# Patient Record
Sex: FEMALE | Race: WHITE | NOT HISPANIC OR LATINO | Employment: OTHER | URBAN - METROPOLITAN AREA
[De-identification: names, ages, dates, MRNs, and addresses within clinical notes are randomized per-mention and may not be internally consistent; named-entity substitution may affect disease eponyms.]

---

## 2018-06-28 ENCOUNTER — TRANSCRIBE ORDERS (OUTPATIENT)
Dept: ADMINISTRATIVE | Facility: HOSPITAL | Age: 63
End: 2018-06-28

## 2018-06-28 DIAGNOSIS — M81.0 OSTEOPOROSIS, UNSPECIFIED OSTEOPOROSIS TYPE, UNSPECIFIED PATHOLOGICAL FRACTURE PRESENCE: Primary | ICD-10-CM

## 2018-07-02 ENCOUNTER — HOSPITAL ENCOUNTER (OUTPATIENT)
Dept: BONE DENSITY | Facility: HOSPITAL | Age: 63
Discharge: HOME | End: 2018-07-02
Payer: COMMERCIAL

## 2018-07-02 DIAGNOSIS — M81.0 OSTEOPOROSIS, UNSPECIFIED OSTEOPOROSIS TYPE, UNSPECIFIED PATHOLOGICAL FRACTURE PRESENCE: ICD-10-CM

## 2018-07-02 PROCEDURE — 77080 DXA BONE DENSITY AXIAL: CPT

## 2019-07-01 ENCOUNTER — TRANSCRIBE ORDERS (OUTPATIENT)
Dept: ADMINISTRATIVE | Facility: HOSPITAL | Age: 64
End: 2019-07-01

## 2019-07-01 ENCOUNTER — HOSPITAL ENCOUNTER (OUTPATIENT)
Dept: RADIOLOGY | Facility: HOSPITAL | Age: 64
Discharge: HOME | End: 2019-07-01
Payer: MEDICARE

## 2019-07-01 DIAGNOSIS — R06.02 SHORTNESS OF BREATH: ICD-10-CM

## 2019-07-01 DIAGNOSIS — R06.02 SHORTNESS OF BREATH: Primary | ICD-10-CM

## 2019-07-01 PROCEDURE — 71046 X-RAY EXAM CHEST 2 VIEWS: CPT

## 2019-07-29 ENCOUNTER — TRANSCRIBE ORDERS (OUTPATIENT)
Dept: ADMINISTRATIVE | Facility: HOSPITAL | Age: 64
End: 2019-07-29

## 2019-07-29 DIAGNOSIS — R06.02 SOB (SHORTNESS OF BREATH): Primary | ICD-10-CM

## 2019-07-30 ENCOUNTER — TRANSCRIBE ORDERS (OUTPATIENT)
Dept: NON INVASIVE DIAGNOSTICS | Facility: CLINIC | Age: 64
End: 2019-07-30

## 2019-07-30 DIAGNOSIS — R06.02 SHORTNESS OF BREATH: Primary | ICD-10-CM

## 2019-07-30 DIAGNOSIS — R51.9 NONINTRACTABLE HEADACHE, UNSPECIFIED CHRONICITY PATTERN, UNSPECIFIED HEADACHE TYPE: ICD-10-CM

## 2019-07-30 DIAGNOSIS — L94.0 SCLERODERMA, LOCALIZED: ICD-10-CM

## 2019-07-30 DIAGNOSIS — M54.5 LOW BACK PAIN, UNSPECIFIED BACK PAIN LATERALITY, UNSPECIFIED CHRONICITY, WITH SCIATICA PRESENCE UNSPECIFIED: ICD-10-CM

## 2019-08-06 ENCOUNTER — HOSPITAL ENCOUNTER (OUTPATIENT)
Dept: PULMONOLOGY | Facility: HOSPITAL | Age: 64
Discharge: HOME | End: 2019-08-06
Payer: MEDICARE

## 2019-08-06 DIAGNOSIS — R06.02 SOB (SHORTNESS OF BREATH): ICD-10-CM

## 2019-08-06 PROCEDURE — 94729 DIFFUSING CAPACITY: CPT

## 2019-08-06 PROCEDURE — 94760 N-INVAS EAR/PLS OXIMETRY 1: CPT

## 2019-08-06 PROCEDURE — 94727 GAS DIL/WSHOT DETER LNG VOL: CPT

## 2019-08-06 PROCEDURE — 94726 PLETHYSMOGRAPHY LUNG VOLUMES: CPT | Performed by: INTERNAL MEDICINE

## 2019-08-06 PROCEDURE — 94060 EVALUATION OF WHEEZING: CPT

## 2019-08-06 PROCEDURE — 94060 EVALUATION OF WHEEZING: CPT | Performed by: INTERNAL MEDICINE

## 2019-08-06 PROCEDURE — 94729 DIFFUSING CAPACITY: CPT | Performed by: INTERNAL MEDICINE

## 2019-08-06 RX ORDER — ALBUTEROL SULFATE 2.5 MG/3ML
2.5 SOLUTION RESPIRATORY (INHALATION) ONCE AS NEEDED
Status: COMPLETED | OUTPATIENT
Start: 2019-08-06 | End: 2019-08-06

## 2019-08-06 RX ADMIN — ALBUTEROL SULFATE 2.5 MG: 2.5 SOLUTION RESPIRATORY (INHALATION) at 09:04

## 2019-09-09 ENCOUNTER — HOSPITAL ENCOUNTER (OUTPATIENT)
Dept: NON INVASIVE DIAGNOSTICS | Facility: CLINIC | Age: 64
Discharge: HOME | End: 2019-09-09
Payer: MEDICARE

## 2019-09-09 DIAGNOSIS — L94.0 SCLERODERMA, LOCALIZED: ICD-10-CM

## 2019-09-09 DIAGNOSIS — R06.02 SHORTNESS OF BREATH: ICD-10-CM

## 2019-09-09 DIAGNOSIS — R51.9 NONINTRACTABLE HEADACHE, UNSPECIFIED CHRONICITY PATTERN, UNSPECIFIED HEADACHE TYPE: ICD-10-CM

## 2019-09-09 DIAGNOSIS — M54.5 LOW BACK PAIN, UNSPECIFIED BACK PAIN LATERALITY, UNSPECIFIED CHRONICITY, WITH SCIATICA PRESENCE UNSPECIFIED: ICD-10-CM

## 2019-09-09 PROCEDURE — 93306 TTE W/DOPPLER COMPLETE: CPT | Performed by: INTERNAL MEDICINE

## 2019-09-09 PROCEDURE — 93306 TTE W/DOPPLER COMPLETE: CPT

## 2019-11-18 ENCOUNTER — HOSPITAL ENCOUNTER (OUTPATIENT)
Dept: RADIOLOGY | Facility: HOSPITAL | Age: 64
Discharge: HOME | End: 2019-11-18
Payer: MEDICARE

## 2019-11-18 ENCOUNTER — TRANSCRIBE ORDERS (OUTPATIENT)
Dept: ADMINISTRATIVE | Facility: HOSPITAL | Age: 64
End: 2019-11-18

## 2019-11-18 DIAGNOSIS — M54.2 CERVICALGIA: ICD-10-CM

## 2019-11-18 DIAGNOSIS — M54.2 CERVICALGIA: Primary | ICD-10-CM

## 2019-11-18 PROCEDURE — 72050 X-RAY EXAM NECK SPINE 4/5VWS: CPT

## 2020-02-14 ENCOUNTER — HOSPITAL ENCOUNTER (OUTPATIENT)
Dept: RADIOLOGY | Facility: HOSPITAL | Age: 65
Discharge: HOME | End: 2020-02-14
Payer: MEDICARE

## 2020-02-14 ENCOUNTER — TRANSCRIBE ORDERS (OUTPATIENT)
Dept: ADMINISTRATIVE | Facility: HOSPITAL | Age: 65
End: 2020-02-14

## 2020-02-14 DIAGNOSIS — M25.511 RIGHT SHOULDER PAIN, UNSPECIFIED CHRONICITY: ICD-10-CM

## 2020-02-14 DIAGNOSIS — M25.511 RIGHT SHOULDER PAIN, UNSPECIFIED CHRONICITY: Primary | ICD-10-CM

## 2020-02-14 PROCEDURE — 73030 X-RAY EXAM OF SHOULDER: CPT

## 2020-03-10 ENCOUNTER — TRANSCRIBE ORDERS (OUTPATIENT)
Dept: NON INVASIVE DIAGNOSTICS | Facility: CLINIC | Age: 65
End: 2020-03-10

## 2020-03-10 DIAGNOSIS — I77.819 DILATATION OF AORTA (HCC): Primary | ICD-10-CM

## 2020-04-10 ENCOUNTER — HOSPITAL ENCOUNTER (OUTPATIENT)
Dept: NON INVASIVE DIAGNOSTICS | Facility: CLINIC | Age: 65
Discharge: HOME | End: 2020-04-10
Payer: MEDICARE

## 2020-04-10 DIAGNOSIS — I77.819 DILATATION OF AORTA (HCC): ICD-10-CM

## 2020-04-10 PROCEDURE — 93306 TTE W/DOPPLER COMPLETE: CPT

## 2020-04-10 PROCEDURE — 93306 TTE W/DOPPLER COMPLETE: CPT | Performed by: INTERNAL MEDICINE

## 2020-05-26 ENCOUNTER — TRANSCRIBE ORDERS (OUTPATIENT)
Dept: CARDIOLOGY CLINIC | Facility: CLINIC | Age: 65
End: 2020-05-26

## 2020-05-26 DIAGNOSIS — Z01.818 PRE-OP EVALUATION: Primary | ICD-10-CM

## 2020-05-27 ENCOUNTER — CONSULT (OUTPATIENT)
Dept: CARDIOLOGY CLINIC | Facility: CLINIC | Age: 65
End: 2020-05-27
Payer: MEDICARE

## 2020-05-27 VITALS
HEIGHT: 64 IN | HEART RATE: 70 BPM | WEIGHT: 126 LBS | DIASTOLIC BLOOD PRESSURE: 86 MMHG | BODY MASS INDEX: 21.51 KG/M2 | SYSTOLIC BLOOD PRESSURE: 120 MMHG

## 2020-05-27 DIAGNOSIS — Z01.818 PRE-OP EVALUATION: ICD-10-CM

## 2020-05-27 DIAGNOSIS — I71.2 THORACIC AORTIC ANEURYSM WITHOUT RUPTURE (HCC): ICD-10-CM

## 2020-05-27 DIAGNOSIS — R06.00 DOE (DYSPNEA ON EXERTION): Primary | ICD-10-CM

## 2020-05-27 DIAGNOSIS — Z82.49 FAMILY HISTORY OF EARLY CAD: ICD-10-CM

## 2020-05-27 PROBLEM — I71.20 THORACIC AORTIC ANEURYSM WITHOUT RUPTURE: Status: ACTIVE | Noted: 2020-05-27

## 2020-05-27 PROCEDURE — 99204 OFFICE O/P NEW MOD 45 MIN: CPT | Performed by: PHYSICIAN ASSISTANT

## 2020-05-27 PROCEDURE — 93000 ELECTROCARDIOGRAM COMPLETE: CPT | Performed by: PHYSICIAN ASSISTANT

## 2020-05-27 RX ORDER — ALPRAZOLAM 0.25 MG/1
0.25 TABLET ORAL DAILY PRN
COMMUNITY
Start: 2020-05-13

## 2020-05-27 RX ORDER — ESTRADIOL 0.1 MG/G
CREAM VAGINAL
COMMUNITY
Start: 2018-10-05

## 2020-05-27 RX ORDER — CLOBETASOL PROPIONATE 0.5 MG/G
1 CREAM TOPICAL AS NEEDED
COMMUNITY
Start: 2020-05-11

## 2020-06-02 DIAGNOSIS — R06.02 SOB (SHORTNESS OF BREATH): Primary | ICD-10-CM

## 2020-06-08 ENCOUNTER — HOSPITAL ENCOUNTER (OUTPATIENT)
Dept: CT IMAGING | Facility: HOSPITAL | Age: 65
Discharge: HOME | End: 2020-06-08
Payer: MEDICARE

## 2020-06-08 DIAGNOSIS — R06.00 DOE (DYSPNEA ON EXERTION): ICD-10-CM

## 2020-06-08 DIAGNOSIS — I71.2 THORACIC AORTIC ANEURYSM WITHOUT RUPTURE (HCC): ICD-10-CM

## 2020-06-08 PROCEDURE — 71260 CT THORAX DX C+: CPT

## 2020-06-08 RX ADMIN — IOHEXOL 85 ML: 350 INJECTION, SOLUTION INTRAVENOUS at 11:18

## 2020-06-10 ENCOUNTER — TELEPHONE (OUTPATIENT)
Dept: CARDIOLOGY CLINIC | Facility: CLINIC | Age: 65
End: 2020-06-10

## 2020-06-11 ENCOUNTER — TELEPHONE (OUTPATIENT)
Dept: CARDIOLOGY CLINIC | Facility: CLINIC | Age: 65
End: 2020-06-11

## 2020-06-29 ENCOUNTER — HOSPITAL ENCOUNTER (OUTPATIENT)
Dept: NON INVASIVE DIAGNOSTICS | Facility: CLINIC | Age: 65
Discharge: HOME | End: 2020-06-29
Payer: MEDICARE

## 2020-06-29 DIAGNOSIS — I71.2 THORACIC AORTIC ANEURYSM WITHOUT RUPTURE (HCC): ICD-10-CM

## 2020-06-29 DIAGNOSIS — R06.00 DOE (DYSPNEA ON EXERTION): ICD-10-CM

## 2020-06-29 PROCEDURE — 93351 STRESS TTE COMPLETE: CPT | Performed by: INTERNAL MEDICINE

## 2020-06-29 PROCEDURE — 93350 STRESS TTE ONLY: CPT

## 2020-07-08 ENCOUNTER — OFFICE VISIT (OUTPATIENT)
Dept: CARDIOLOGY CLINIC | Facility: CLINIC | Age: 65
End: 2020-07-08
Payer: MEDICARE

## 2020-07-08 VITALS
DIASTOLIC BLOOD PRESSURE: 80 MMHG | HEIGHT: 64 IN | HEART RATE: 80 BPM | WEIGHT: 122 LBS | BODY MASS INDEX: 20.83 KG/M2 | SYSTOLIC BLOOD PRESSURE: 110 MMHG

## 2020-07-08 DIAGNOSIS — Z82.49 FAMILY HISTORY OF EARLY CAD: ICD-10-CM

## 2020-07-08 DIAGNOSIS — R06.02 SOB (SHORTNESS OF BREATH): ICD-10-CM

## 2020-07-08 DIAGNOSIS — R91.8 PULMONARY NODULES: Primary | ICD-10-CM

## 2020-07-08 DIAGNOSIS — I71.2 THORACIC AORTIC ANEURYSM WITHOUT RUPTURE (HCC): ICD-10-CM

## 2020-07-08 PROCEDURE — 99214 OFFICE O/P EST MOD 30 MIN: CPT | Performed by: PHYSICIAN ASSISTANT

## 2020-07-08 RX ORDER — PRAVASTATIN SODIUM 10 MG
5 TABLET ORAL DAILY
COMMUNITY
Start: 2020-06-08

## 2020-07-08 NOTE — ASSESSMENT & PLAN NOTE
BREWER with climbing stairs or walking up an incline  ALISON is non-ischemic  CT showed lung nodules   Patient will discuss with PCP

## 2020-07-08 NOTE — ASSESSMENT & PLAN NOTE
Aortic arch was found to be 3 4 cm and the ascending Aorta was 4 4 cm on echo  CT shows Aorta to be 4 cm at the right pulmonary artery      With Symptoms of BREWER   ALISON was non-ischemic

## 2020-07-08 NOTE — PROGRESS NOTES
[facility]   Outpatient Note  Tari Villarreal  1955  [id number]  [facility] [address] [facility] [address]  [facility] [id number] [phone]    Tari Villarreal is a 76 y o  female    Assessment and Plan:   Thoracic aortic aneurysm without rupture (HCC)  Aortic arch was found to be 3 4 cm and the ascending Aorta was 4 4 cm on echo  CT shows Aorta to be 4 cm at the right pulmonary artery      With Symptoms of BREWER   ALISON was non-ischemic       SOB (shortness of breath)  BREWER with climbing stairs or walking up an incline  ALISON is non-ischemic  CT showed lung nodules   Patient will discuss with PCP    Family history of early CAD  Brother had MI at 77        Additional Plan:   No Medication changes made or testing ordered today  Return visit will be in one year months or earlier if there are problems  The patient is encouraged to call in the meantime if there are questions or concerns  Subjective: The patient is here for a follow up regarding a finding of an aneurysmal Thoracic Aorta found on echo that was obtained because she was complaining of SOB and chest pain  The Aortic arch was found to be 3 4 cm and the ascending Aorta was 4 4 cm   She has been complaining BREWER and chest tightness that she has been attributing to her "body structure"   She tells me however that she is SOB when she climbs the stairs or walks up an incline  She states that her chest pain was alleviated with massage therapy techniques, but returned after a few weeks  From a cardiac perspective, she has no palpitations syncope or near syncope, and denies edema orthopnea or PND  She does not complain of TIA or CVA symptoms           Social History  Social History     Tobacco Use   Smoking Status Never Smoker   Smokeless Tobacco Never Used   ,   Social History     Substance and Sexual Activity   Alcohol Use Yes    Comment: social   ,   Social History     Substance and Sexual Activity   Drug Use Never     Family History   Problem Relation Age of Onset    Pancreatitis Mother 59        nondrinker    Kidney cancer Father 58    Coronary artery disease Brother 72        left main    Heart attack Brother 72    Lymphoma Brother         nonhodgkin's       Medical and Surgical History  Past Medical History:   Diagnosis Date    Ankylosis     Anxiety     Frozen shoulder     Osteoporosis     Spinal stenosis     Thoracic aortic aneurysm without rupture      No past surgical history on file  Current Outpatient Medications:     ALPRAZolam (XANAX) 0 25 mg tablet, Take 0 25 mg by mouth daily as needed, Disp: , Rfl:     clobetasol (TEMOVATE) 0 05 % cream, Apply to affected areas 2-3x/week for maintenance, Disp: , Rfl:     estradiol (ESTRACE VAGINAL) 0 1 mg/g vaginal cream, INSERT 1 GRAM VAGINALLY ONCE A WEEK, Disp: , Rfl:     pravastatin (PRAVACHOL) 10 mg tablet, Take 5 mg by mouth daily, Disp: , Rfl:   Allergies   Allergen Reactions    Amoxicillin-Pot Clavulanate Rash    Sulfa Antibiotics Rash       Review of Systems   Constitution: Negative  HENT: Negative  Eyes: Negative  Cardiovascular: Positive for dyspnea on exertion  Negative for chest pain, claudication, cyanosis, irregular heartbeat, leg swelling, near-syncope, orthopnea, palpitations, paroxysmal nocturnal dyspnea and syncope  Respiratory: Negative  Negative for cough, hemoptysis, shortness of breath, sleep disturbances due to breathing, snoring, sputum production and wheezing  Endocrine: Negative  Hematologic/Lymphatic: Negative  Skin: Negative  Musculoskeletal: Negative  Gastrointestinal: Negative  Genitourinary: Negative  Neurological: Negative  Psychiatric/Behavioral: Negative  Allergic/Immunologic: Negative          Objective:   /80   Pulse 80   Ht 5' 3 5" (1 613 m)   Wt 55 3 kg (122 lb)   BMI 21 27 kg/m²   Physical Exam   Constitutional: She is oriented to person, place, and time  She appears well-developed and well-nourished  HENT:   Head: Normocephalic and atraumatic  Mouth/Throat: Oropharynx is clear and moist    Eyes: Conjunctivae and EOM are normal  No scleral icterus  Neck: Normal range of motion  Neck supple  No JVD present  No tracheal deviation present  Cardiovascular: Normal rate, regular rhythm, normal heart sounds and intact distal pulses  Exam reveals no gallop and no friction rub  No murmur heard  Pulmonary/Chest: Effort normal and breath sounds normal  No respiratory distress  She has no wheezes  She has no rales  She exhibits no tenderness  Abdominal: Soft  Bowel sounds are normal  She exhibits no distension  There is no tenderness  Aorta not palpable    Musculoskeletal: Normal range of motion  She exhibits no edema or tenderness  Neurological: She is alert and oriented to person, place, and time  Skin: Skin is warm and dry  No rash noted  No erythema  No pallor  Psychiatric: She has a normal mood and affect  Her behavior is normal    Nursing note and vitals reviewed  Lab Review:   No results found for: CHOL  No results found for: HDL  No results found for: LDLCALC  No results found for: TRIG  Results Reviewed     None        Results Reviewed     None        Results Reviewed     None          Recent Cardiovascular Testing:   CT 6-8-2020: Enlargement of the ascending aorta measuring 4 cm at the level the right pulmonary artery  2  Scattered pulmonary nodules measuring up to 5 mm  ALISON 6-: Normal symptom, BP, and ECG response  I couldn't absolutely exclude small prior inferobasal MI  No ischemia by echo criteria  ECHO 4-: LEFT VENTRICLE:  Systolic function was normal  Ejection fraction was estimated to be 65 %  There were no regional wall motion abnormalities    RIGHT VENTRICLE:Systolic function was normal    AORTIC VALVE:The valve was trileaflet   Leaflets exhibited normal thickness and normal cuspal separation  There was mild regurgitation    TRICUSPID VALVE:There was mild regurgitation    AORTA:The root exhibited mild dilatation  There was moderate dilatation of the ascending aorta  4 4 cm  There was mild dilatation of the aortic arch  3 8 cm      ECG Review:   NSR

## 2020-12-02 ENCOUNTER — TRANSCRIBE ORDERS (OUTPATIENT)
Dept: ADMINISTRATIVE | Facility: HOSPITAL | Age: 65
End: 2020-12-02

## 2020-12-02 DIAGNOSIS — R30.0 DYSURIA: Primary | ICD-10-CM

## 2020-12-07 ENCOUNTER — HOSPITAL ENCOUNTER (OUTPATIENT)
Dept: ULTRASOUND IMAGING | Facility: HOSPITAL | Age: 65
Discharge: HOME | End: 2020-12-07
Payer: MEDICARE

## 2020-12-07 DIAGNOSIS — R30.0 DYSURIA: ICD-10-CM

## 2020-12-07 DIAGNOSIS — R10.2 PELVIC PAIN IN FEMALE: ICD-10-CM

## 2020-12-07 DIAGNOSIS — N30.10 INTERSTITIAL CYSTITIS: ICD-10-CM

## 2020-12-07 PROCEDURE — 76856 US EXAM PELVIC COMPLETE: CPT

## 2020-12-07 PROCEDURE — 76830 TRANSVAGINAL US NON-OB: CPT

## 2021-03-16 ENCOUNTER — TELEPHONE (OUTPATIENT)
Dept: NEUROLOGY | Facility: CLINIC | Age: 66
End: 2021-03-16

## 2021-03-16 NOTE — TELEPHONE ENCOUNTER
Best contact number for FZWWLNM:[phone]    Emergency Contact name and number:None    Referring provider and telephone number: Abdileonie Nguyen    Primary Care Provider Name and if affiliated with [facility]     Reason for Appointment/Dx:Headache     Have you seen and followed up with a pediatric Neurologist for this disease in the past?  None    Neurology Location patient would like to be seen:[facility] received? Yes                                                 Records Received? No    Have you ever seen another Neurologist?       No     [facility]     ID/Policy #:    Secondary Insurance:    ID/Policy#: Workman's Comp/ Accident/ School  Information      Workman's Comp/Accident/School related?        None    If yes name of Insurance company:    Date of Injury:    Type of Injury:    [address] Name and Telephone Number:    Notes:Appointment schedule with patient new patient pack sent                    Appointment date: 10- 12:30pm with Dr Azucena Heimlich

## 2021-08-17 ENCOUNTER — HOSPITAL ENCOUNTER (OUTPATIENT)
Dept: BONE DENSITY | Facility: HOSPITAL | Age: 66
Discharge: HOME | End: 2021-08-17
Payer: MEDICARE

## 2021-08-17 DIAGNOSIS — M81.0 AGE-RELATED OSTEOPOROSIS WITHOUT CURRENT PATHOLOGICAL FRACTURE: ICD-10-CM

## 2021-08-17 PROCEDURE — 77080 DXA BONE DENSITY AXIAL: CPT

## 2021-09-10 ENCOUNTER — HOSPITAL ENCOUNTER (OUTPATIENT)
Dept: RADIOLOGY | Facility: HOSPITAL | Age: 66
Discharge: HOME | End: 2021-09-10
Payer: MEDICARE

## 2021-09-10 DIAGNOSIS — R06.02 SHORTNESS OF BREATH: ICD-10-CM

## 2021-09-10 PROCEDURE — 71046 X-RAY EXAM CHEST 2 VIEWS: CPT

## 2021-09-15 PROCEDURE — U0003 INFECTIOUS AGENT DETECTION BY NUCLEIC ACID (DNA OR RNA); SEVERE ACUTE RESPIRATORY SYNDROME CORONAVIRUS 2 (SARS-COV-2) (CORONAVIRUS DISEASE [COVID-19]), AMPLIFIED PROBE TECHNIQUE, MAKING USE OF HIGH THROUGHPUT TECHNOLOGIES AS DESCRIBED BY CMS-2020-01-R: HCPCS | Performed by: NURSE PRACTITIONER

## 2021-09-15 PROCEDURE — U0005 INFEC AGEN DETEC AMPLI PROBE: HCPCS | Performed by: NURSE PRACTITIONER

## 2021-09-22 ENCOUNTER — OFFICE VISIT (OUTPATIENT)
Dept: CARDIOLOGY CLINIC | Facility: CLINIC | Age: 66
End: 2021-09-22
Payer: MEDICARE

## 2021-09-22 VITALS
WEIGHT: 125 LBS | HEART RATE: 87 BPM | HEIGHT: 64 IN | DIASTOLIC BLOOD PRESSURE: 86 MMHG | SYSTOLIC BLOOD PRESSURE: 120 MMHG | BODY MASS INDEX: 21.34 KG/M2

## 2021-09-22 DIAGNOSIS — I71.2 THORACIC AORTIC ANEURYSM WITHOUT RUPTURE (HCC): Primary | ICD-10-CM

## 2021-09-22 DIAGNOSIS — R06.02 SOB (SHORTNESS OF BREATH): ICD-10-CM

## 2021-09-22 PROCEDURE — 93000 ELECTROCARDIOGRAM COMPLETE: CPT | Performed by: PHYSICIAN ASSISTANT

## 2021-09-22 PROCEDURE — 99214 OFFICE O/P EST MOD 30 MIN: CPT | Performed by: PHYSICIAN ASSISTANT

## 2021-09-22 RX ORDER — ALBUTEROL SULFATE 90 UG/1
AEROSOL, METERED RESPIRATORY (INHALATION)
COMMUNITY
Start: 2021-09-15

## 2021-09-22 NOTE — ASSESSMENT & PLAN NOTE
Aortic arch was found to be 3 4 cm and the ascending Aorta was 4 4 cm on echo 4/10/2020  CT shows Aorta to be 4 cm at the right pulmonary artery 6-8-2020      With Symptoms of BREWER   ALISON was non-ischemic 6-    Repeat Echo will be ordered to assess this year

## 2021-09-22 NOTE — PROGRESS NOTES
[facility]   Outpatient Note  Louann De  1955  [id number]  [facility] [address]  [facility] [id number] [phone]    Louann De is a 78 y o  female    Assessment and Plan:   SOB (shortness of breath)  Worsening for the last few months  Even at rest  Now on an inhaler and has improved some what but not yet at normal   awaiting PFT  Will check echo and BNP       Thoracic aortic aneurysm without rupture (HCC)  Aortic arch was found to be 3 4 cm and the ascending Aorta was 4 4 cm on echo 4/10/2020  CT shows Aorta to be 4 cm at the right pulmonary artery 6-8-2020      With Symptoms of BREWER   ALISON was non-ischemic 6-    Repeat Echo will be ordered to assess this year       Family history of early CAD  Brother had MI at 77        Additional Plan:   No Medication changes made or testing ordered today  Return visit will be in one year or earlier if there are problems  The patient is encouraged to call in the meantime if there are questions or concerns  Subjective: The patient is here for a follow up regarding a finding of an aneurysmal Thoracic Aorta found on echo that was obtained because she was complaining of SOB and chest pain  The Aortic arch was found to be 3 4 cm and the ascending Aorta was 4 4 cm  She is complaining of SOB and is being worked up for asthma  She has been prescribed an inhaler and is feeling much better  She is awaiting PFTs  She continues to run and is not hindered, but is not back to her baseline yet  She has no chest pain  From a cardiac perspective, she has no palpitations syncope or near syncope, and denies edema orthopnea or PND  She does not complain of TIA or CVA symptoms           Social History  Social History     Tobacco Use   Smoking Status Never Smoker   Smokeless Tobacco Never Used   ,   Social History     Substance and Sexual Activity Alcohol Use Yes    Comment: social   ,   Social History     Substance and Sexual Activity   Drug Use Never     Family History   Problem Relation Age of Onset    Pancreatitis Mother 59        nondrinker    Kidney cancer Father 58    Coronary artery disease Brother 72        left main    Heart attack Brother 72    Lymphoma Brother         nonhodgkin's       Medical and Surgical History  Past Medical History:   Diagnosis Date    Ankylosis     Anxiety     Ear problems     Eustachian tube dysfunction     Frozen shoulder     Osteoporosis     Spinal stenosis     Thoracic aortic aneurysm without rupture      Past Surgical History:   Procedure Laterality Date    CATARACT EXTRACTION Left     TONSILLECTOMY      TUBAL LIGATION      TYMPANOSTOMY TUBE PLACEMENT Bilateral          Current Outpatient Medications:     albuterol (Ventolin HFA) 90 mcg/act inhaler, inhale 2 puff by inhalation route  every 4 - 6 hours as needed, Disp: , Rfl:     ALPRAZolam (XANAX) 0 25 mg tablet, Take 0 25 mg by mouth daily as needed, Disp: , Rfl:     clobetasol (TEMOVATE) 0 05 % cream, Apply to affected areas 2-3x/week for maintenance, Disp: , Rfl:     estradiol (ESTRACE VAGINAL) 0 1 mg/g vaginal cream, INSERT 1 GRAM VAGINALLY ONCE A WEEK, Disp: , Rfl:     pravastatin (PRAVACHOL) 10 mg tablet, Take 5 mg by mouth daily (Patient not taking: Reported on 9/22/2021), Disp: , Rfl:   Allergies   Allergen Reactions    Amoxicillin-Pot Clavulanate Rash    Sulfa Antibiotics Rash       Review of Systems   Constitutional: Negative  HENT: Negative  Eyes: Negative  Cardiovascular: Negative for chest pain, claudication, cyanosis, dyspnea on exertion, irregular heartbeat, leg swelling, near-syncope, orthopnea, palpitations, paroxysmal nocturnal dyspnea and syncope  Respiratory: Positive for shortness of breath  Negative for cough, hemoptysis, sleep disturbances due to breathing, snoring, sputum production and wheezing      Endocrine: Negative  Hematologic/Lymphatic: Negative  Skin: Negative  Musculoskeletal: Negative  Gastrointestinal: Negative  Genitourinary: Negative  Neurological: Negative  Psychiatric/Behavioral: Negative  Allergic/Immunologic: Negative  Objective:   /86   Pulse 87   Ht 5' 3 5" (1 613 m)   Wt 56 7 kg (125 lb)   BMI 21 80 kg/m²   Physical Exam  Vitals and nursing note reviewed  Constitutional:       Appearance: She is well-developed  HENT:      Head: Normocephalic and atraumatic  Eyes:      General: No scleral icterus  Conjunctiva/sclera: Conjunctivae normal    Neck:      Vascular: No JVD  Trachea: No tracheal deviation  Cardiovascular:      Rate and Rhythm: Normal rate and regular rhythm  Pulses: Intact distal pulses  Heart sounds: Normal heart sounds  No murmur heard  No friction rub  No gallop  Pulmonary:      Effort: Pulmonary effort is normal  No respiratory distress  Breath sounds: Normal breath sounds  No wheezing or rales  Chest:      Chest wall: No tenderness  Abdominal:      General: Bowel sounds are normal  There is no distension  Palpations: Abdomen is soft  Tenderness: There is no abdominal tenderness  Comments: Aorta not palpable    Musculoskeletal:         General: No tenderness  Normal range of motion  Cervical back: Normal range of motion and neck supple  Skin:     General: Skin is warm and dry  Coloration: Skin is not pale  Findings: No erythema or rash  Neurological:      Mental Status: She is alert and oriented to person, place, and time     Psychiatric:         Behavior: Behavior normal          Lab Review:   No results found for: CHOL  No results found for: HDL  No results found for: LDLCALC  No results found for: TRIG  Results Reviewed     None        Results Reviewed     None        Results Reviewed     None          Recent Cardiovascular Testing:   CT 6-8-2020: Enlargement of the ascending aorta measuring 4 cm at the level the right pulmonary artery  2  Scattered pulmonary nodules measuring up to 5 mm  ALISON 6-: Normal symptom, BP, and ECG response  I couldn't absolutely exclude small prior inferobasal MI  No ischemia by echo criteria  ECHO 4-: LEFT VENTRICLE:  Systolic function was normal  Ejection fraction was estimated to be 65 %  There were no regional wall motion abnormalities    RIGHT VENTRICLE:Systolic function was normal    AORTIC VALVE:The valve was trileaflet  Leaflets exhibited normal thickness and normal cuspal separation  There was mild regurgitation    TRICUSPID VALVE:There was mild regurgitation    AORTA:The root exhibited mild dilatation  There was moderate dilatation of the ascending aorta  4 4 cm  There was mild dilatation of the aortic arch  3 8 cm      ECG Review:   9-22-21: Normal sinus rhythm   LAD  NS T wave changes     5/27/2020: NSR

## 2021-09-22 NOTE — ASSESSMENT & PLAN NOTE
Worsening for the last few months  Even at rest  Now on an inhaler and has improved some what but not yet at normal   awaiting PFT  Will check echo and BNP

## 2021-09-23 ENCOUNTER — HOSPITAL ENCOUNTER (OUTPATIENT)
Dept: NON INVASIVE DIAGNOSTICS | Facility: CLINIC | Age: 66
Discharge: HOME | End: 2021-09-23
Payer: MEDICARE

## 2021-09-23 DIAGNOSIS — I71.2 THORACIC AORTIC ANEURYSM WITHOUT RUPTURE (HCC): ICD-10-CM

## 2021-09-23 DIAGNOSIS — R06.02 SOB (SHORTNESS OF BREATH): ICD-10-CM

## 2021-09-23 PROCEDURE — 93306 TTE W/DOPPLER COMPLETE: CPT

## 2021-09-23 PROCEDURE — 93306 TTE W/DOPPLER COMPLETE: CPT | Performed by: INTERNAL MEDICINE

## 2021-09-29 ENCOUNTER — HOSPITAL ENCOUNTER (OUTPATIENT)
Dept: PULMONOLOGY | Facility: HOSPITAL | Age: 66
Discharge: HOME | End: 2021-09-29
Attending: INTERNAL MEDICINE
Payer: MEDICARE

## 2021-09-29 DIAGNOSIS — R06.02 SHORTNESS OF BREATH: ICD-10-CM

## 2021-09-29 PROCEDURE — 94729 DIFFUSING CAPACITY: CPT

## 2021-09-29 PROCEDURE — 94726 PLETHYSMOGRAPHY LUNG VOLUMES: CPT | Performed by: INTERNAL MEDICINE

## 2021-09-29 PROCEDURE — 94760 N-INVAS EAR/PLS OXIMETRY 1: CPT

## 2021-09-29 PROCEDURE — 94060 EVALUATION OF WHEEZING: CPT

## 2021-09-29 PROCEDURE — 94726 PLETHYSMOGRAPHY LUNG VOLUMES: CPT

## 2021-09-29 PROCEDURE — 94729 DIFFUSING CAPACITY: CPT | Performed by: INTERNAL MEDICINE

## 2021-09-29 PROCEDURE — 94060 EVALUATION OF WHEEZING: CPT | Performed by: INTERNAL MEDICINE

## 2021-09-29 RX ORDER — ALBUTEROL SULFATE 2.5 MG/3ML
2.5 SOLUTION RESPIRATORY (INHALATION) ONCE AS NEEDED
Status: COMPLETED | OUTPATIENT
Start: 2021-09-29 | End: 2021-09-29

## 2021-09-29 RX ADMIN — ALBUTEROL SULFATE 2.5 MG: 2.5 SOLUTION RESPIRATORY (INHALATION) at 12:50

## 2021-10-08 ENCOUNTER — TELEPHONE (OUTPATIENT)
Dept: NEUROLOGY | Facility: CLINIC | Age: 66
End: 2021-10-08

## 2021-10-11 ENCOUNTER — TELEPHONE (OUTPATIENT)
Dept: NEUROLOGY | Facility: CLINIC | Age: 66
End: 2021-10-11

## 2021-10-13 ENCOUNTER — CONSULT (OUTPATIENT)
Dept: NEUROLOGY | Facility: CLINIC | Age: 66
End: 2021-10-13
Payer: MEDICARE

## 2021-10-13 VITALS
BODY MASS INDEX: 21.17 KG/M2 | HEART RATE: 72 BPM | DIASTOLIC BLOOD PRESSURE: 88 MMHG | WEIGHT: 124 LBS | HEIGHT: 64 IN | SYSTOLIC BLOOD PRESSURE: 118 MMHG

## 2021-10-13 DIAGNOSIS — G08 DURAL SINUS THROMBOSIS: Primary | ICD-10-CM

## 2021-10-13 DIAGNOSIS — J34.89 THICK NASAL MUCUS: ICD-10-CM

## 2021-10-13 DIAGNOSIS — G44.52 NDPH (NEW DAILY PERSISTENT HEADACHE): ICD-10-CM

## 2021-10-13 DIAGNOSIS — R05.9 COUGH: ICD-10-CM

## 2021-10-13 DIAGNOSIS — G44.84 EXERTIONAL HEADACHE: ICD-10-CM

## 2021-10-13 PROCEDURE — 99205 OFFICE O/P NEW HI 60 MIN: CPT | Performed by: PHYSICIAN ASSISTANT

## 2021-10-20 ENCOUNTER — HOSPITAL ENCOUNTER (OUTPATIENT)
Dept: MRI IMAGING | Facility: HOSPITAL | Age: 66
Discharge: HOME | End: 2021-10-20
Payer: MEDICARE

## 2021-10-20 DIAGNOSIS — G08 DURAL SINUS THROMBOSIS: ICD-10-CM

## 2021-10-20 DIAGNOSIS — G44.52 NDPH (NEW DAILY PERSISTENT HEADACHE): ICD-10-CM

## 2021-10-20 DIAGNOSIS — G44.84 EXERTIONAL HEADACHE: ICD-10-CM

## 2021-10-20 PROCEDURE — G1004 CDSM NDSC: HCPCS

## 2021-10-20 PROCEDURE — 70553 MRI BRAIN STEM W/O & W/DYE: CPT

## 2021-10-20 PROCEDURE — A9585 GADOBUTROL INJECTION: HCPCS | Performed by: PHYSICIAN ASSISTANT

## 2021-10-20 RX ADMIN — GADOBUTROL 5 ML: 604.72 INJECTION INTRAVENOUS at 17:11

## 2021-11-02 ENCOUNTER — TELEPHONE (OUTPATIENT)
Dept: NEUROLOGY | Facility: CLINIC | Age: 66
End: 2021-11-02

## 2022-05-18 ENCOUNTER — HOSPITAL ENCOUNTER (OUTPATIENT)
Dept: CT IMAGING | Facility: HOSPITAL | Age: 67
Discharge: HOME | End: 2022-05-18
Payer: MEDICARE

## 2022-05-18 DIAGNOSIS — R91.1 PULMONARY NODULE: ICD-10-CM

## 2022-05-18 PROCEDURE — 71250 CT THORAX DX C-: CPT

## 2022-05-18 PROCEDURE — G1004 CDSM NDSC: HCPCS

## 2022-10-12 PROBLEM — R05.9 COUGH: Status: RESOLVED | Noted: 2021-10-13 | Resolved: 2022-10-12

## 2022-12-27 ENCOUNTER — OFFICE VISIT (OUTPATIENT)
Dept: CARDIOLOGY CLINIC | Facility: CLINIC | Age: 67
End: 2022-12-27

## 2022-12-27 VITALS
HEIGHT: 64 IN | DIASTOLIC BLOOD PRESSURE: 80 MMHG | SYSTOLIC BLOOD PRESSURE: 120 MMHG | HEART RATE: 71 BPM | WEIGHT: 121 LBS | BODY MASS INDEX: 20.66 KG/M2

## 2022-12-27 DIAGNOSIS — I71.21 ANEURYSM OF ASCENDING AORTA WITHOUT RUPTURE: Primary | ICD-10-CM

## 2022-12-27 RX ORDER — ZOLPIDEM TARTRATE 5 MG/1
TABLET ORAL
COMMUNITY
Start: 2022-12-19

## 2022-12-27 NOTE — PROGRESS NOTES
Patient ID: Celeste Bland is a 75 y o  female  Plan:      Thoracic aortic aneurysm without rupture ([facility] [address] )  No change on recent CT  Follow up Plan/Other summary comments:  Return in about 18 months (around 7/8/2024)  Echo just prior  HPI: Patient is seen in follow-up today regarding the above  Since the last visit with me she had a CT of the chest   This shows unchanged very small nodules and unchanged aortic size  Recommendation was that the nodules need not be followed any further and the size of the aorta correlates well with the prior echo  Otherwise patient remains stable  No change in exertional capacity  Results for orders placed or performed in visit on 12/27/22   POCT ECG    Impression    Sinus rhythm  Leftward axis  Otherwise normal          Most recent or relevant cardiac/vascular testing:    TTE 9/23/2021: LVEF 60%  Mild to moderate aortic insufficiency  Aortic root 3 8 cm, ascending aorta 4 2 cm  CT of chest 5/18/2022: Ascending aorta 4 1 cm    Past Surgical History:   Procedure Laterality Date   • CATARACT EXTRACTION Left    • TONSILLECTOMY     • TUBAL LIGATION     • TYMPANOSTOMY TUBE PLACEMENT Bilateral        Lipid Profile: No results found for: CHOL, TRIG, HDL, LDL      Review of Systems   10  point ROS  was otherwise non pertinent or negative except as per HPI or as below  Gait: Normal          Objective:     /80   Pulse 71   Ht 5' 3 5" (1 613 m)   Wt 54 9 kg (121 lb)   BMI 21 10 kg/m²     PHYSICAL EXAM:    General:  Normal appearance in no distress  Eyes:  Anicteric  Oral mucosa:  Moist   Neck:  No JVD  Carotid upstrokes are brisk without bruits  No masses  Chest:  Clear to auscultation  Cardiac:  No palpable PMI  Normal S1 and S2  No murmur gallop or rub  Abdomen:  Soft and nontender  No palpable organomegaly or aortic enlargement  Extremities:  No peripheral edema  Musculoskeletal:  Symmetric     Vascular:  Femoral pulses are brisk without bruits  Popliteal pulses are intact bilaterally  Pedal pulses are intact  Neuro:  Grossly symmetric  Psych:  Alert and oriented x3          Current Outpatient Medications:   •  albuterol (PROVENTIL HFA,VENTOLIN HFA) 90 mcg/act inhaler, inhale 2 puff by inhalation route  every 4 - 6 hours as needed, Disp: , Rfl:   •  ALPRAZolam (XANAX) 0 25 mg tablet, Take 0 25 mg by mouth daily as needed, Disp: , Rfl:   •  clobetasol (TEMOVATE) 0 05 % cream, Apply 1 application topically as needed , Disp: , Rfl:   •  estradiol (ESTRACE) 0 1 mg/g vaginal cream, INSERT 1 GRAM VAGINALLY ONCE A WEEK, Disp: , Rfl:   •  zolpidem (AMBIEN) 5 mg tablet, TAKE 1 TABLET BY MOUTH AT BEDTIME FOR INSOMNIA, Disp: , Rfl:   Allergies   Allergen Reactions   • Amoxicillin-Pot Clavulanate Rash   • Sulfa Antibiotics Rash     Past Medical History:   Diagnosis Date   • Ankylosis    • Anxiety    • Asthma    • Ear problems    • Eustachian tube dysfunction    • Frozen shoulder    • Osteoporosis    • Spinal stenosis    • Thoracic aortic aneurysm without rupture            Social History     Tobacco Use   Smoking Status Never   Smokeless Tobacco Never

## 2024-03-18 ENCOUNTER — HOSPITAL ENCOUNTER (OUTPATIENT)
Dept: BONE DENSITY | Facility: HOSPITAL | Age: 69
Discharge: HOME | End: 2024-03-18
Payer: MEDICARE

## 2024-03-18 VITALS — BODY MASS INDEX: 19.63 KG/M2 | WEIGHT: 115 LBS | HEIGHT: 64 IN

## 2024-03-18 DIAGNOSIS — Z78.0 ASYMPTOMATIC MENOPAUSAL STATE: ICD-10-CM

## 2024-03-18 DIAGNOSIS — M81.0 AGE-RELATED OSTEOPOROSIS WITHOUT CURRENT PATHOLOGICAL FRACTURE: ICD-10-CM

## 2024-03-18 PROCEDURE — 77080 DXA BONE DENSITY AXIAL: CPT

## 2024-03-20 ENCOUNTER — OFFICE VISIT (OUTPATIENT)
Dept: RHEUMATOLOGY | Facility: CLINIC | Age: 69
End: 2024-03-20
Payer: MEDICARE

## 2024-03-20 VITALS
OXYGEN SATURATION: 99 % | HEIGHT: 64 IN | TEMPERATURE: 98.6 F | WEIGHT: 115 LBS | SYSTOLIC BLOOD PRESSURE: 128 MMHG | BODY MASS INDEX: 19.63 KG/M2 | DIASTOLIC BLOOD PRESSURE: 82 MMHG

## 2024-03-20 DIAGNOSIS — M81.0 AGE-RELATED OSTEOPOROSIS WITHOUT CURRENT PATHOLOGICAL FRACTURE: Primary | ICD-10-CM

## 2024-03-20 PROCEDURE — 99204 OFFICE O/P NEW MOD 45 MIN: CPT | Performed by: STUDENT IN AN ORGANIZED HEALTH CARE EDUCATION/TRAINING PROGRAM

## 2024-03-20 RX ORDER — IBUPROFEN 200 MG
1 CAPSULE ORAL DAILY
Qty: 90 TABLET | Refills: 2 | Status: CANCELLED | OUTPATIENT
Start: 2024-03-20

## 2024-03-20 NOTE — PROGRESS NOTES
Rheumatology Outpatient Consult Note  3/20/2024       Yash Bull DO  [address]  [address] [place] [address]    Reason for referral: osteoporosis    Assessment: 68 y.o. female referred for the above.    Reticent to start treatment, explained r:b including ONJ risk of Evenity --> Reclast or Evenity --> Prolia    She will consider and get back to me    In the meantime she will start taking her calcium citrate more consistently than she has been    Patient's rheumatologic disease(s) threaten long-term function if not appropriately treated.    Encounter Diagnosis     ICD-10-CM    1. Age-related osteoporosis without current pathological fracture  M81.0 PTH, intact          Plan:  -As above  -If decides to do Evenity then will recheck a calcium at that time before starting  -RTC 1 year or sooner PRN    Inocencio Garcia DO, CCD    Inocencio Garcia DO, CCD  [facility]     History: Estrella Raza is a(n) 68 y.o. female who is referred for the above. Known OA C-spine, R shoulder, CVID, spinal stenosis    Tried actonel a couple of years ago, got reflux but kept taking it    Did fosamax last year, same issue, had an EGD because of how severe it was, when she stopped taking it it wouldn't go away so she kept taking pepcid until it finally went away. Altogether had maybe a month or less of oral BP exposure    Does weight-bearing exercise 5-6 days a week      Past Medical History:   Diagnosis Date    Ankylosis     Anxiety     Asthma     Ear problems     Eustachian tube dysfunction     Frozen shoulder     Hypogammaglobulinemia (HCC)     Osteoporosis     Spinal stenosis     Thoracic aortic aneurysm without rupture        Past Surgical History:   Procedure Laterality Date    CATARACT EXTRACTION Left     TONSILLECTOMY      TUBAL LIGATION      TYMPANOSTOMY TUBE PLACEMENT Bilateral        Outpatient Medications Marked as Taking for the 3/20/24 encounter (Office Visit) with Inocencio Garcia DO  "  Medication    ALPRAZolam (XANAX) 0.25 mg tablet    busPIRone (BUSPAR) 7.5 mg tablet    estradiol (ESTRACE) 0.1 mg/g vaginal cream    zolpidem (AMBIEN) 5 mg tablet       Allergies as of 03/20/2024 - Reviewed 03/20/2024   Allergen Reaction Noted    Amoxicillin-pot clavulanate Rash 11/02/2016    Sulfa antibiotics Rash 11/02/2016       Family History   Problem Relation Age of Onset    Pancreatitis Mother 64        nondrinker    Kidney cancer Father 62    Coronary artery disease Brother 65        left main    Heart attack Brother 65    Lymphoma Brother         nonhodgkin's       Social History:  Social History     Tobacco Use    Smoking status: Never    Smokeless tobacco: Never   Vaping Use    Vaping status: Never Used   Substance Use Topics    Alcohol use: Yes     Comment: Socially    Drug use: Never       Review of Systems: Pertinent findings documented in HPI    ___________________________________    Physical Exam:    /82   Temp 98.6 °F (37 °C) (Tympanic)   Ht 5' 3.5\" (1.613 m)   Wt 52.2 kg (115 lb)   SpO2 99%   BMI 20.05 kg/m²     General: Well appearing, in no distress.   Eyes: EOMI  Neuro: Alert and oriented.  Skin: No rashes.  Musculoskeletal exam: MS grossly intact, normal gait  ____________________________    Lab Result Review: relevant labs reviewed   Latest Reference Range & Units 03/12/24 08:37   Creatinine 0.40 - 1.10 mg/dL 0.69 (E)   Calcium 8.5 - 10.1 mg/dL 8.5 (E)   Albumin 3.5 - 5.7 g/dL 3.7 (E)   GFR, Calculated >59  93 (E)   (E): External lab result    25-OH-VitD in Oct 2023 was 42    Imaging Result Review: relevant images reviewed    DXA: reviewed  "

## 2024-03-20 NOTE — PATIENT INSTRUCTIONS
I would like to do a year of Evenity followed either by one dose of Reclast or, more likely, Prolia every 6 months    Please get blood work (parathyroid hormone) checked today    If/when you decide to pursue treatment, we will recheck your calcium level before starting    Please take your calcium citrate every day

## 2024-04-19 LAB — PTH-INTACT SERPL-MCNC: 54.6 PG/ML (ref 12–88)

## 2024-08-12 ENCOUNTER — HOSPITAL ENCOUNTER (OUTPATIENT)
Dept: CT IMAGING | Facility: HOSPITAL | Age: 69
Discharge: HOME | End: 2024-08-12
Payer: MEDICARE

## 2024-08-12 DIAGNOSIS — R06.02 SHORTNESS OF BREATH: ICD-10-CM

## 2024-08-12 DIAGNOSIS — R05.1 ACUTE COUGH: ICD-10-CM

## 2024-08-12 PROCEDURE — 71250 CT THORAX DX C-: CPT

## 2024-08-16 ENCOUNTER — HOSPITAL ENCOUNTER (OUTPATIENT)
Dept: NON INVASIVE DIAGNOSTICS | Facility: CLINIC | Age: 69
Discharge: HOME | End: 2024-08-16
Payer: MEDICARE

## 2024-08-16 VITALS
HEART RATE: 68 BPM | BODY MASS INDEX: 19.63 KG/M2 | HEIGHT: 64 IN | SYSTOLIC BLOOD PRESSURE: 120 MMHG | WEIGHT: 115 LBS | DIASTOLIC BLOOD PRESSURE: 66 MMHG

## 2024-08-16 DIAGNOSIS — I71.21 ANEURYSM OF ASCENDING AORTA WITHOUT RUPTURE (HCC): ICD-10-CM

## 2024-08-16 LAB
AORTIC ROOT: 4 CM
APICAL FOUR CHAMBER EJECTION FRACTION: 50 %
ASCENDING AORTA: 4.2 CM
AV LVOT MEAN GRADIENT: 5 MMHG
AV LVOT PEAK GRADIENT: 8 MMHG
BSA FOR ECHO PROCEDURE: 1.54 M2
DOP CALC LVOT PEAK VEL VTI: 29.35 CM
DOP CALC LVOT PEAK VEL: 1.42 M/S
E WAVE DECELERATION TIME: 180 MS
E/A RATIO: 0.9
FRACTIONAL SHORTENING: 28 (ref 28–44)
INTERVENTRICULAR SEPTUM IN DIASTOLE (PARASTERNAL SHORT AXIS VIEW): 1.2 CM
INTERVENTRICULAR SEPTUM: 1.2 CM (ref 0.6–1.1)
LAAS-AP2: 17.1 CM2
LAAS-AP4: 8.9 CM2
LEFT ATRIUM SIZE: 3.4 CM
LEFT ATRIUM VOLUME (MOD BIPLANE): 32 ML
LEFT ATRIUM VOLUME INDEX (MOD BIPLANE): 20.8 ML/M2
LEFT INTERNAL DIMENSION IN SYSTOLE: 2.9 CM (ref 2.1–4)
LEFT VENTRICULAR INTERNAL DIMENSION IN DIASTOLE: 4 CM (ref 3.5–6)
LEFT VENTRICULAR POSTERIOR WALL IN END DIASTOLE: 1 CM
LEFT VENTRICULAR STROKE VOLUME: 37 ML
LVSV (TEICH): 37 ML
MV E'TISSUE VEL-SEP: 6 CM/S
MV PEAK A VEL: 0.58 M/S
MV PEAK E VEL: 52 CM/S
MV STENOSIS PRESSURE HALF TIME: 52 MS
MV VALVE AREA P 1/2 METHOD: 4.2
RA PRESSURE ESTIMATED: 8 MMHG
RIGHT ATRIUM AREA SYSTOLE A4C: 10 CM2
RIGHT VENTRICLE ID DIMENSION: 2.5 CM
RV PSP: 25 MMHG
SL CV LEFT ATRIUM LENGTH A2C: 4.5 CM
SL CV LV EF: 55
SL CV PED ECHO LEFT VENTRICLE DIASTOLIC VOLUME (MOD BIPLANE) 2D: 70 ML
SL CV PED ECHO LEFT VENTRICLE SYSTOLIC VOLUME (MOD BIPLANE) 2D: 34 ML
TR MAX PG: 17 MMHG
TR PEAK VELOCITY: 2.1 M/S
TRICUSPID ANNULAR PLANE SYSTOLIC EXCURSION: 2.1 CM
TRICUSPID VALVE PEAK REGURGITATION VELOCITY: 2.08 M/S

## 2024-08-16 PROCEDURE — 93306 TTE W/DOPPLER COMPLETE: CPT

## 2024-08-16 PROCEDURE — 93306 TTE W/DOPPLER COMPLETE: CPT | Performed by: INTERNAL MEDICINE

## 2025-01-14 ENCOUNTER — OFFICE VISIT (OUTPATIENT)
Dept: CARDIOLOGY CLINIC | Facility: CLINIC | Age: 70
End: 2025-01-14
Payer: MEDICARE

## 2025-01-14 VITALS
BODY MASS INDEX: 19.81 KG/M2 | SYSTOLIC BLOOD PRESSURE: 112 MMHG | DIASTOLIC BLOOD PRESSURE: 72 MMHG | HEIGHT: 64 IN | WEIGHT: 116 LBS

## 2025-01-14 DIAGNOSIS — I71.20 THORACIC AORTIC ANEURYSM WITHOUT RUPTURE, UNSPECIFIED PART (HCC): Primary | ICD-10-CM

## 2025-01-14 DIAGNOSIS — R06.02 SHORTNESS OF BREATH: ICD-10-CM

## 2025-01-14 PROCEDURE — 99214 OFFICE O/P EST MOD 30 MIN: CPT | Performed by: NURSE PRACTITIONER

## 2025-01-14 PROCEDURE — 93000 ELECTROCARDIOGRAM COMPLETE: CPT | Performed by: INTERNAL MEDICINE

## 2025-01-14 NOTE — PROGRESS NOTES
Patient ID: Estrella Raza is a 69 y.o. female.        Plan:      Assessment & Plan  Thoracic aortic aneurysm without rupture, unspecified part (HCC)  No change on recent CT or echo  Surveillance echo ordered for 2 years  Shortness of breath  Mild, has been attributed to underlying pulm condition  See additional comments below      Follow up Plan/Summary Comments:  Estrella is doing well from a cardiac perspective.  Her thoracic aortic aneurysm remains stable and we will plan for repeat imaging by echo in 2 years.    She has been having some difficulties with exertional dyspnea though this has improved since the summer.  She is planning to follow-up with pulmonary which I encouraged.  If her symptoms persist or become worse, she will be in touch with our office to consider stress testing to rule out coronary insufficiency.    Follow-up in 2 years.  She will call sooner if needed    HPI: I the pleasure meeting Estrella in the office today for a routine visit.    Estrella is followed in the office for thoracic aortic aneurysm.  Results of the echocardiogram performed in August as well as CT imaging from August were reviewed with her today.    She reports that the CT scan performed at that time showed bronchiectasis/early COPD.  She notes that she has some difficulties with breathing in the summer due to increased humidity.  She has been referred to pulmonary but has not yet been seen.    She exercises 5 days a week doing a combination of Lazaro or kickboxing and weight training.  She denies any shortness of breath during her workouts.  She does note that when walking up an incline or climbing the basement steps she does get a little winded, but does not need to stop and rest..  Overall, her breathing symptoms have improved since the summertime.    She denies any chest discomfort, palpitations, lightheadedness, dizziness, syncope.      She is noted to have an elevated LDL, however reports trying a statin in the  "past but had side effects and prefers to remain off medications.    Review of Systems   10  point ROS  was otherwise non pertinent or negative except as per HPI or as below.   Gait: Normal      Most recent or relevant cardiac/vascular testing:    Echo 8/16/2024  EF 55%  Mild to moderate AI  Mild MR, TR  Mildly dilated aortic root, 4.0 cm and ascending aorta, 4.2 cm    Echo 9/23/2021  EF 60%  Mild to moderate AI  Mild MR, TR  Mildly dilated aortic root, 3.8 cm and ascending aorta, 4.2 cm    Results for orders placed or performed in visit on 01/14/25   POCT ECG    Impression    Normal sinus rhythm, LAFB, cannot exclude prior septal infarct age undetermined.  When compared to prior tracing, 12/27/2022, there is no significant change       Objective:     /72   Ht 5' 3.5\" (1.613 m)   Wt 52.6 kg (116 lb)   BMI 20.23 kg/m²     PHYSICAL EXAM:    General:  Normal appearance, no acute distress  Eyes:  Anicteric.  Oral mucosa:  Moist.  Neck:  No JVD. Carotid upstrokes are brisk without bruits.  No masses.  Chest:  Clear to auscultation   Cardiac:  No palpable PMI.  Normal S1 and S2.  Soft 1-2/6 murmur   Abdomen:  Soft and nontender. No palpable organomegaly or aortic enlargement.  Extremities:  No peripheral edema.  Musculoskeletal:  Symmetric.   Vascular:  Pedal pulses are intact.  Neuro:  Grossly symmetric.  Psych:  Alert and oriented x3.    Allergies   Allergen Reactions    Amoxicillin-Pot Clavulanate Rash    Sulfa Antibiotics Rash       Current Outpatient Medications:     albuterol (PROVENTIL HFA,VENTOLIN HFA) 90 mcg/act inhaler, inhale 2 puff by inhalation route  every 4 - 6 hours as needed, Disp: , Rfl:     ALPRAZolam (XANAX) 0.25 mg tablet, Take 0.25 mg by mouth daily as needed, Disp: , Rfl:     clobetasol (TEMOVATE) 0.05 % cream, Apply 1 application topically as needed , Disp: , Rfl:     estradiol (ESTRACE) 0.1 mg/g vaginal cream, INSERT 1 GRAM VAGINALLY ONCE A WEEK, Disp: , Rfl:     zolpidem (AMBIEN) 5 mg " tablet, TAKE 1 TABLET BY MOUTH AT BEDTIME FOR INSOMNIA, Disp: , Rfl:   Past Medical History:   Diagnosis Date    Ankylosis     Anxiety     Asthma     Ear problems     Eustachian tube dysfunction     Frozen shoulder     Hypogammaglobulinemia (HCC)     Osteoporosis     Pulmonary nodules     Spinal stenosis     Thoracic aortic aneurysm without rupture      Past Surgical History:   Procedure Laterality Date    CATARACT EXTRACTION Left     TONSILLECTOMY      TUBAL LIGATION      TYMPANOSTOMY TUBE PLACEMENT Bilateral        CMP:   Lab Results   Component Value Date    K 4.0 09/11/2024     09/11/2024    CO2 29 09/11/2024    BUN 16 09/11/2024    CREATININE 0.57 09/11/2024    EGFR 98 09/11/2024    EGFR 94 11/30/2020     Lipid Profile:    Lab Results   Component Value Date    TRIG 68 09/11/2024         Social History     Tobacco Use   Smoking Status Never   Smokeless Tobacco Never

## 2025-04-01 ENCOUNTER — EVALUATION (OUTPATIENT)
Dept: PHYSICAL THERAPY | Facility: HOME HEALTHCARE | Age: 70
End: 2025-04-01
Payer: MEDICARE

## 2025-04-01 DIAGNOSIS — M54.2 CERVICALGIA: Primary | ICD-10-CM

## 2025-04-01 PROCEDURE — 97161 PT EVAL LOW COMPLEX 20 MIN: CPT

## 2025-04-01 PROCEDURE — 97140 MANUAL THERAPY 1/> REGIONS: CPT

## 2025-04-01 NOTE — PROGRESS NOTES
PT Evaluation     Today's date: 2025  Patient name: Estrella Raza  : 1955  MRN: [id number]  Referring provider: Yash Bull DO  Dx:   Encounter Diagnosis     ICD-10-CM    1. Cervicalgia  M54.2           Start Time: 1301  Stop Time: 1345  Total time in clinic (min): 44 minutes    Assessment  Impairments: abnormal or restricted ROM, activity intolerance, lacks appropriate home exercise program, pain with function, poor posture , participation limitations and activity limitations  Symptom irritability: moderate    Assessment details: Estrella Raza is a 69 y.o. female presenting to OP PT clinic in [place] w/ referral for cervicalgia w/o radicular sx.  Pt presents w/ decreased strength in CS ROM (retraction endurance), decreased P/AROM in CS mvmts,  decreased ability to perform functional and recreational activities due to neck pain.  Pt has difficulty performing ADLs and recreational activities due to above mentioned deficits.  Pt would benefit from skilled therapy to address impairments, allowing pt to perform ADLs and recreational activities w/o restriction or pain to improve QoL and return to OF.  PT gave pt HEP focusing on performing exercises/ activities outside of the clinic to further improve and address impairments.  Thank you for this referral!  Understanding of Dx/Px/POC: good     Prognosis: good    Goals  STG:  -Pt will improve pain from 5/10 to 2/10 at worst to allow reduced difficulty performing ADLs due to pain in 4 weeks.  -Pt will increase CS L SB AROM from 50* to 60* to allow pt w/ improved ability to perform house chores w/ less difficulty in 4 weeks.  -Pt will increase CS retraction endurance strength allow pt w/ improved ability of performing prolonged CS retraction w/ less difficulty in 4 weeks.    LTG:  -Pt will be d/c from OP PT w/ I HEP to allow pt to continue improving upon their impairments for improved ADLs/ recreational activities in 12 weeks.  -Pt will  improve neck pain sx allowing pt to return to full recreational activities such as lifting, w/o difficulty in 12 weeks.       Plan  Patient would benefit from: skilled physical therapy  Planned modality interventions: thermotherapy: hydrocollator packs, cryotherapy and neuromuscular electric stimulation    Planned therapy interventions: abdominal trunk stabilization, manual therapy, massage, neuromuscular re-education, postural training, therapeutic activities, therapeutic exercise, home exercise program, functional ROM exercises, flexibility and balance/weight bearing training    Frequency: 2x week  Duration in weeks: 12  Plan of Care beginning date: 2025  Plan of Care expiration date: 2025  Treatment plan discussed with: patient  Plan details: Begin POC focusing on addressing & improving impairments listed in eval.        Subjective Evaluation    History of Present Illness  Mechanism of injury: Pt states that CS pain was persistent during employment, has been fully retired for roughly a year, had no neck pain 6 months after correction.  Had COVID in November and has side effects of Long COVID, sx of neck pain returned after COVID and have been persistent ever since, roughly 4-5 months of time.  No radicular sx.  R sided pain is > than L.  Reducing weights for exercises.  Pt states that she has changes in BUE strength and  strength, but no N/T in hands.  Pt using Bengay topical for pain relief.  Was prescribed muscle relaxers, but not taking anymore.      Patient Goals  Patient goals for therapy: decreased pain, increased motion, return to sport/leisure activities, independence with ADLs/IADLs and increased strength    Pain  Current pain ratin  At best pain ratin  At worst pain ratin  Quality: dull ache, throbbing and tight  Aggravating factors: overhead activity and lifting      Diagnostic Tests  X-ray: abnormal      Objective     Postural Observations  Seated posture: fair  Standing  posture: fair  Correction of posture: makes symptoms better      Active Range of Motion   Cervical/Thoracic Spine       Cervical    Flexion: 55 degrees  with pain  Extension: 50 degrees      Left lateral flexion: 50 degrees     with pain  Right lateral flexion: 55 degrees     with pain  Left rotation: 75 degrees  Right rotation: 75 degrees     Left Shoulder   Normal active range of motion    Right Shoulder   Normal active range of motion    Passive Range of Motion   Cervical/Thoracic Spine   Cervical     Left lateral flexion (degrees): 65    Strength/Myotome Testing     Left Shoulder   Normal muscle strength    Right Shoulder   Normal muscle strength    Tests   Cervical   Positive vertical compression.  Negative cervical distraction.     Lumbar   Positive vertical compression .       Flowsheet Rows      Flowsheet Row Most Recent Value   PT/OT G-Codes    Current Score 56   Projected Score 66               Precautions: dry eyes      Visit Count 1           Manual 4/1       CS Decompression + SO release  DF - 8'        CS PROM stretch in all planes of motion b/l R UT stretch to the L - 2'        B/L pec stretch                   Neuro Re-Ed                                       Ther Ex         *ALL EXERCISES W/ PROPER POSTURE*         UE bike         MTP/LTP         Supine CS retractions HEP        Scap Squeezes HEP        UT Stretch HEP        Posterior Scap rolls         90:90 stretch in doorway         Prone I, Y, Ts  Neurofeedback w/ palpation of Lower Trap                   Prone DB rows  Neurofeedback w/ palpation of Lower Trap         Seated CS retraction         Plank on Elbows w/ CS                                                           Ther Activity                             Gait Training                             Modalities         CS Traction         HP on CS w/ IFC Estim             Access Code: [email or web address]  URL: [email or web address]  Date: 04/01/2025  Prepared by: Niranjan  Twin    Exercises  - Seated Scapular Retraction  - 1 x daily - 7 x weekly - 3 sets - 10 reps  - Seated Upper Trapezius Stretch  - 1 x daily - 7 x weekly - 3 sets - 10 reps  - Seated Cervical Retraction  - 1 x daily - 7 x weekly - 3 sets - 10 reps  - Supine Chin Tuck  - 1 x daily - 7 x weekly - 3 sets - 10 reps

## 2025-04-01 NOTE — LETTER
2025    Yash Bull DO  [address] [address]    Patient: Estrella Raza   YOB: 1955   Date of Visit: 2025     Encounter Diagnosis     ICD-10-CM    1. Cervicalgia  M54.2           Dear Dr. Bull:    Thank you for your recent referral of Estrella Raza. Please review the attached evaluation summary from Estrella SANDERS's recent visit.     Please verify that you agree with the plan of care by signing the attached order.     If you have any questions or concerns, please do not hesitate to call.     I sincerely appreciate the opportunity to share in the care of one of your patients and hope to have another opportunity to work with you in the near future.       Sincerely,    Niranjan Murcia, PT      Referring Provider:      I certify that I have read the below Plan of Care and certify the need for these services furnished under this plan of treatment while under my care.                    Yash Bull DO  [address] [address]  Via Fax: [phone]          PT Evaluation     Today's date: 2025  Patient name: Estrella Raza  : 1955  MRN: [id number]  Referring provider: Yash Bull DO  Dx:   Encounter Diagnosis     ICD-10-CM    1. Cervicalgia  M54.2           Start Time: 1301  Stop Time: 1345  Total time in clinic (min): 44 minutes    Assessment  Impairments: abnormal or restricted ROM, activity intolerance, lacks appropriate home exercise program, pain with function, poor posture , participation limitations and activity limitations  Symptom irritability: moderate    Assessment details: Estrella Raza is a 69 y.o. female presenting to OP PT clinic in [place] w/ referral for cervicalgia w/o radicular sx.  Pt presents w/ decreased strength in CS ROM (retraction endurance), decreased P/AROM in CS mvmts,  decreased ability to perform functional and recreational activities due to neck pain.   Pt has difficulty performing ADLs and recreational activities due to above mentioned deficits.  Pt would benefit from skilled therapy to address impairments, allowing pt to perform ADLs and recreational activities w/o restriction or pain to improve QoL and return to PLOF.  PT gave pt HEP focusing on performing exercises/ activities outside of the clinic to further improve and address impairments.  Thank you for this referral!  Understanding of Dx/Px/POC: good     Prognosis: good    Goals  STG:  -Pt will improve pain from 5/10 to 2/10 at worst to allow reduced difficulty performing ADLs due to pain in 4 weeks.  -Pt will increase CS L SB AROM from 50* to 60* to allow pt w/ improved ability to perform house chores w/ less difficulty in 4 weeks.  -Pt will increase CS retraction endurance strength allow pt w/ improved ability of performing prolonged CS retraction w/ less difficulty in 4 weeks.    LTG:  -Pt will be d/c from OP PT w/ I HEP to allow pt to continue improving upon their impairments for improved ADLs/ recreational activities in 12 weeks.  -Pt will improve neck pain sx allowing pt to return to full recreational activities such as lifting, w/o difficulty in 12 weeks.       Plan  Patient would benefit from: skilled physical therapy  Planned modality interventions: thermotherapy: hydrocollator packs, cryotherapy and neuromuscular electric stimulation    Planned therapy interventions: abdominal trunk stabilization, manual therapy, massage, neuromuscular re-education, postural training, therapeutic activities, therapeutic exercise, home exercise program, functional ROM exercises, flexibility and balance/weight bearing training    Frequency: 2x week  Duration in weeks: 12  Plan of Care beginning date: 4/1/2025  Plan of Care expiration date: 6/24/2025  Treatment plan discussed with: patient  Plan details: Begin POC focusing on addressing & improving impairments listed in eval.        Subjective Evaluation    History  of Present Illness  Mechanism of injury: Pt states that CS pain was persistent during employment, has been fully retired for roughly a year, had no neck pain 6 months after halfway.  Had COVID in November and has side effects of Long COVID, sx of neck pain returned after COVID and have been persistent ever since, roughly 4-5 months of time.  No radicular sx.  R sided pain is > than L.  Reducing weights for exercises.  Pt states that she has changes in BUE strength and  strength, but no N/T in hands.  Pt using Bengay topical for pain relief.  Was prescribed muscle relaxers, but not taking anymore.      Patient Goals  Patient goals for therapy: decreased pain, increased motion, return to sport/leisure activities, independence with ADLs/IADLs and increased strength    Pain  Current pain ratin  At best pain ratin  At worst pain ratin  Quality: dull ache, throbbing and tight  Aggravating factors: overhead activity and lifting      Diagnostic Tests  X-ray: abnormal      Objective     Postural Observations  Seated posture: fair  Standing posture: fair  Correction of posture: makes symptoms better      Active Range of Motion   Cervical/Thoracic Spine       Cervical    Flexion: 55 degrees  with pain  Extension: 50 degrees      Left lateral flexion: 50 degrees     with pain  Right lateral flexion: 55 degrees     with pain  Left rotation: 75 degrees  Right rotation: 75 degrees     Left Shoulder   Normal active range of motion    Right Shoulder   Normal active range of motion    Passive Range of Motion   Cervical/Thoracic Spine   Cervical     Left lateral flexion (degrees): 65    Strength/Myotome Testing     Left Shoulder   Normal muscle strength    Right Shoulder   Normal muscle strength    Tests   Cervical   Positive vertical compression.  Negative cervical distraction.     Lumbar   Positive vertical compression .       Flowsheet Rows      Flowsheet Row Most Recent Value   PT/OT G-Codes    Current Score 56    Projected Score 66               Precautions: dry eyes      Visit Count 1           Manual 4/1       CS Decompression + SO release  DF - 8'        CS PROM stretch in all planes of motion b/l R UT stretch to the L - 2'        B/L pec stretch                   Neuro Re-Ed                                       Ther Ex         *ALL EXERCISES W/ PROPER POSTURE*         UE bike         MTP/LTP         Supine CS retractions HEP        Scap Squeezes HEP        UT Stretch HEP        Posterior Scap rolls         90:90 stretch in doorway         Prone I, Y, Ts  Neurofeedback w/ palpation of Lower Trap                   Prone DB rows  Neurofeedback w/ palpation of Lower Trap         Seated CS retraction         Plank on Elbows w/ CS                                                           Ther Activity                             Gait Training                             Modalities         CS Traction         HP on CS w/ IFC Estim             Access Code: [email or web address]  URL: [email or web address]  Date: 04/01/2025  Prepared by: Niranjan Murcia    Exercises  - Seated Scapular Retraction  - 1 x daily - 7 x weekly - 3 sets - 10 reps  - Seated Upper Trapezius Stretch  - 1 x daily - 7 x weekly - 3 sets - 10 reps  - Seated Cervical Retraction  - 1 x daily - 7 x weekly - 3 sets - 10 reps  - Supine Chin Tuck  - 1 x daily - 7 x weekly - 3 sets - 10 reps

## 2025-04-08 ENCOUNTER — OFFICE VISIT (OUTPATIENT)
Dept: PHYSICAL THERAPY | Facility: HOME HEALTHCARE | Age: 70
End: 2025-04-08
Payer: MEDICARE

## 2025-04-08 DIAGNOSIS — M54.2 CERVICALGIA: Primary | ICD-10-CM

## 2025-04-08 PROCEDURE — 97110 THERAPEUTIC EXERCISES: CPT

## 2025-04-08 PROCEDURE — 97140 MANUAL THERAPY 1/> REGIONS: CPT

## 2025-04-08 NOTE — PROGRESS NOTES
"Daily Note     Today's date: 2025  Patient name: Estrella Raza  : 1955  MRN: [id number]  Referring provider: Yash Bull DO  Dx: No diagnosis found.    Start Time: 1344          Subjective: I am able to do the stretching at home.     Objective: See treatment diary below    Assessment: Pt ariela session well. Reviewed self stretches to ensure understanding and correct form. Pt ariela all self stretch and additional TE as per flow sheet well. Pt reports cerv, UT, and clavicle area tightness t/o session. Pt denied increased discomfort at end of tx with MT and denied MHP to home. Pt would benefit from continued PT    Plan: Continue per plan of care.      Precautions: dry eyes      Visit Count 1 2          Manual  4-8      CS Decompression + SO release  DF - 8' EC  10'       CS PROM stretch in all planes of motion b/l R UT stretch to the L - 2' R UT stretch to the L   2'       B/L pec stretch                   Neuro Re-Ed                                       Ther Ex  -8       *ALL EXERCISES W/ PROPER POSTURE*         UE bike         MTP/LTP  NV       Supine CS retractions HEP 5\" x10       Scap Squeezes HEP 3\" x10       UT Stretch HEP        Posterior Scap rolls         90:90 stretch in doorway         Prone I, Y, Ts  Neurofeedback w/ palpation of Lower Trap  NV                 Prone DB rows  Neurofeedback w/ palpation of Lower Trap  NV       Seated CS retraction  5\" x10       Plank on Elbows w/ CS                                                           Ther Activity                             Gait Training                             Modalities         CS Traction         HP on CS w/ IFC Estim  Pt declined MHP to home               "

## 2025-04-10 ENCOUNTER — OFFICE VISIT (OUTPATIENT)
Dept: PHYSICAL THERAPY | Facility: HOME HEALTHCARE | Age: 70
End: 2025-04-10
Payer: MEDICARE

## 2025-04-10 DIAGNOSIS — M54.2 CERVICALGIA: Primary | ICD-10-CM

## 2025-04-10 PROCEDURE — 97140 MANUAL THERAPY 1/> REGIONS: CPT

## 2025-04-10 PROCEDURE — 97110 THERAPEUTIC EXERCISES: CPT

## 2025-04-10 NOTE — PROGRESS NOTES
"Daily Note     Today's date: 4/10/2025  Patient name: Estrella Raza  : 1955  MRN: [id number]  Referring provider: Yash Bull DO  Dx: No diagnosis found.    Start Time: 1045          Subjective: I felt ok after last visit. Nothing worse and I am able to do my ex at home.     Objective: See treatment diary below    Assessment: . Pt progressed appropriately with UBE and t-band ex. Pt able to complete without c/o increased pain. Pt with cerv tightness mainly at R cerv and UT during rotation and lateral flexion to the L.  Pt to use MHP at home.  Patient would benefit from continued PT    Plan: Continue per plan of care.      Precautions: dry eyes      Visit Count 1 2 3         Manual -10     CS Decompression + SO release  DF - 8' EC  10' EC  10'      CS PROM stretch in all planes of motion b/l R UT stretch to the L - 2' R UT stretch to the L   2' R UT stretch to the L.  2'      B/L pec stretch                   Neuro Re-Ed  10                                    Ther Ex  -10      *ALL EXERCISES W/ PROPER POSTURE*         UE bike   5' katie      MTP/LTP  NV       Supine CS retractions HEP 5\" x10 Seated  5\" x10      Scap Squeezes HEP 3\" x10 3\" x10      UT Stretch HEP        Posterior Scap rolls         90:90 stretch in doorway         Prone I, Y, Ts  Neurofeedback w/ palpation of Lower Trap  NV                 Prone DB rows  Neurofeedback w/ palpation of Lower Trap  NV       Seated CS retraction  5\" x10 5\" x10      Plank on Elbows w/ CS                                                           Ther Activity                             Gait Training                             Modalities         CS Traction         HP on CS w/ IFC Estim  Pt declined MHP to home                 "

## 2025-04-15 ENCOUNTER — OFFICE VISIT (OUTPATIENT)
Dept: PHYSICAL THERAPY | Facility: HOME HEALTHCARE | Age: 70
End: 2025-04-15
Payer: MEDICARE

## 2025-04-15 DIAGNOSIS — M54.2 CERVICALGIA: Primary | ICD-10-CM

## 2025-04-15 PROCEDURE — 97112 NEUROMUSCULAR REEDUCATION: CPT

## 2025-04-15 PROCEDURE — 97110 THERAPEUTIC EXERCISES: CPT

## 2025-04-15 PROCEDURE — 97140 MANUAL THERAPY 1/> REGIONS: CPT

## 2025-04-15 NOTE — PROGRESS NOTES
"Daily Note     Today's date: 4/15/2025  Patient name: Estrella Raza  : 1955  MRN: [id number]  Referring provider: Yash Bull DO  Dx: No diagnosis found.    Start Time: 1342          Subjective: I didn't think I was improving with the PT but now that I think about it I don't have as much pain in my R arm and UT anymore.     Objective: See treatment diary below    Assessment: Pt to session well. Pt was appropriately challenged with TE as per flow sheet without c/o increased s/s. Provided and instructed pt in supine R UE nerve glides and advised to perform with HEP. Pt reports feeling well at end of tx and declined MHP to home.  Patient would benefit from continued PT    Plan: Continue per plan of care.      Precautions: dry eyes      Visit Count 1 2 3 4        Manual 4/1 4-8 4-10 4-15    CS Decompression + SO release  DF - 8' EC  10' EC  10' EC 10'     CS PROM stretch in all planes of motion b/l R UT stretch to the L - 2' R UT stretch to the L   2' R UT stretch to the L.  2' R UT stretch to the L  2'     B/L pec stretch          R UE nerve glides    1x5  1x5 w/gentle sh depression             Neuro Re-Ed  4-8 4-10 4-15                                   Ther Ex  4-8 4-10 4-15     *ALL EXERCISES W/ PROPER POSTURE*         UE bike   5' katie 6' alt     MTP/LTP  NV  Green 3\" x10     Rizwan ER    Red 3\" x15    Supine CS retractions HEP 5\" x10 Seated  5\" x10 supine  5\" x10     Scap Squeezes HEP 3\" x10 HEP      UT Stretch HEP        Posterior Scap rolls         90:90 stretch in doorway         Prone I, Y, Ts  Neurofeedback w/ palpation of Lower Trap  NV  Y's 3\" x10      Prone 90/90   3\" x10 3\" x10     Prone DB rows  Neurofeedback w/ palpation of Lower Trap  NV       Seated CS retraction  5\" x10 5\" x10 5\" x10     Plank on Elbows w/ CS                                                           Ther Activity                             Gait Training                             Modalities         CS Traction    "      HP on CS w/ IFC Estim  Pt declined MHP to home Pt declined to home Pt declined to home

## 2025-04-17 ENCOUNTER — OFFICE VISIT (OUTPATIENT)
Dept: PHYSICAL THERAPY | Facility: HOME HEALTHCARE | Age: 70
End: 2025-04-17
Payer: MEDICARE

## 2025-04-17 DIAGNOSIS — M54.2 CERVICALGIA: Primary | ICD-10-CM

## 2025-04-17 PROCEDURE — 97140 MANUAL THERAPY 1/> REGIONS: CPT

## 2025-04-17 PROCEDURE — 97110 THERAPEUTIC EXERCISES: CPT

## 2025-04-17 NOTE — PROGRESS NOTES
"Daily Note     Today's date: 2025  Patient name: Estrella Raza  : 1955  MRN: [id number]  Referring provider: Yash Bull DO  Dx:   Encounter Diagnosis     ICD-10-CM    1. Cervicalgia  M54.2           Start Time: 1430  Stop Time: 1514  Total time in clinic (min): 44 minutes    Subjective: Pt states that she is having slight improvement regarding pain and sx, however still has stiffness and n/t occurring in R UT into shoulder.      Objective: See treatment diary below      Assessment: Pt tolerated treatment session w/o increase in R shoulder or neck pain.  Pt was able to perform exercises prone w/ proper form and contraction of Lower trap/ mid trap/ & rhomboids.  Pt benefited from static R UT stretch w/ massage by PT.  PT educated pt on HEP ulnar glides for pt's RUE.        Plan: Continue per plan of care.      Precautions: dry eyes      Visit Count 1 2 3 4 5       Manual -8 4-10 4-15 4/17   CS Decompression + SO release  DF - 8' EC  10' EC  10' EC 10'     CS PROM stretch in all planes of motion b/l R UT stretch to the L - 2' R UT stretch to the L   2' R UT stretch to the L.  2' R UT stretch to the L  2'  R UT stretch w/ massage 10'   B/L pec stretch          R UE nerve glides    1x5  1x5 w/gentle sh depression 1x10 w/gentle sh depression  HEP           Neuro Re-Ed  4-8 4-10 4-15                                   Ther Ex  -8 4-10 4-15     *ALL EXERCISES W/ PROPER POSTURE*         UE bike   5' katie 6' alt  8' alt   MTP/LTP  NV  Green 3\" x10  Blue 3\"x15 ea   Rizwan ER    Red 3\" x15 Red 3\" x15   Supine CS retractions HEP 5\" x10 Seated  5\" x10 supine  5\" x10  supine  5\" x10   Scap Squeezes HEP 3\" x10 HEP      UT Stretch HEP        Posterior Scap rolls         90:90 stretch in doorway         Prone I, Y, Ts  Neurofeedback w/ palpation of Lower Trap  NV  Y's 3\" x10  I, Y, T  3\"x15 ea     Prone 90/90   3\" x10 3\" x10  3\" x15   Prone DB rows  Neurofeedback w/ palpation of Lower Trap  NV    2# 3\" " "hold 10x ea b/l   Seated CS retraction  5\" x10 5\" x10 5\" x10     Plank on Elbows w/ CS                                                           Ther Activity                             Gait Training                             Modalities         CS Traction         HP on CS w/ IFC Estim  Pt declined MHP to home Pt declined to home Pt declined to home Pt declined to home                  "

## 2025-04-22 ENCOUNTER — OFFICE VISIT (OUTPATIENT)
Dept: PHYSICAL THERAPY | Facility: HOME HEALTHCARE | Age: 70
End: 2025-04-22
Payer: MEDICARE

## 2025-04-22 DIAGNOSIS — M54.2 CERVICALGIA: Primary | ICD-10-CM

## 2025-04-22 PROCEDURE — 97110 THERAPEUTIC EXERCISES: CPT

## 2025-04-22 PROCEDURE — 97140 MANUAL THERAPY 1/> REGIONS: CPT

## 2025-04-22 NOTE — PROGRESS NOTES
"Daily Note     Today's date: 2025  Patient name: Estrella Raza  : 1955  MRN: [id number]  Referring provider: Yash Bull DO  Dx: No diagnosis found.    Start Time: 1300          Subjective: I still have the tightness at the R side of my neck and UT. The arm pain is gone. Not really much difference at my neck since SOC.     Objective: See treatment diary below    Assessment: .Pt ariela TE well and able to compete correctly with minimal vc's needed. Pt was in agreement of added IASTM to determine benefits. Provided IASTM to R UT and scap using G 4 and to cerv area using G 3. Pt ariela session well with some mild redness noted at UT and scap regions. Pt reported feeling well at end of tx.   Patient would benefit from continued PT    Plan: Continue per plan of care.      Precautions: dry eyes      Visit Count 6 2 3 4 5       Manual -8 4-10 4-15 4/17   CS Decompression + SO release   EC  10' EC  10' EC 10'     CS PROM stretch in all planes of motion b/l R UR stretch to the L  R UT stretch to the L   2' R UT stretch to the L.  2' R UT stretch to the L  2'  R UT stretch w/ massage 10'   B/L pec stretch          R UE nerve glides    1x5  1x5 w/gentle sh depression 1x10 w/gentle sh depression  HEP   IASTM L cerv, UT EC       Neuro Re-Ed  4-8 4-10 4-15                                   Ther Ex  -8 4-10 4-15     *ALL EXERCISES W/ PROPER POSTURE*         UE bike 8' alt  5' katie 6' alt  8' alt   MTP/LTP Blue 3\" x15 ea NV  Green 3\" x10  Blue 3\"x15 ea   Rizwan ER Red 3\" x15   Red 3\" x15 Red 3\" x15   Supine CS retractions 5\" x5  At end of prone ex.  5\" x10 Seated  5\" x10 supine  5\" x10  supine  5\" x10   Scap Squeezes HEP 3\" x10 HEP      UT Stretch HEP        Posterior Scap rolls         90:90 stretch in doorway         Prone I, Y, Ts  Neurofeedback w/ palpation of Lower Trap T,Y,I    3\" x10 ea NV  Y's 3\" x10  I, Y, T  3\"x15 ea     Prone 90/90 3\" x10  3\" x10 3\" x10  3\" x15   Prone DB rows  Neurofeedback w/ " "palpation of Lower Trap 2# 3\" hold x10 ea NV    2# 3\" hold 10x ea b/l   Seated CS retraction  5\" x10 5\" x10 5\" x10     Plank on Elbows w/ CS                                                           Ther Activity                             Gait Training                             Modalities         CS Traction         HP on CS w/ IFC Estim  Pt declined MHP to home Pt declined to home Pt declined to home Pt declined to home                    "

## 2025-04-24 ENCOUNTER — EVALUATION (OUTPATIENT)
Dept: PHYSICAL THERAPY | Facility: HOME HEALTHCARE | Age: 70
End: 2025-04-24
Attending: NURSE PRACTITIONER
Payer: MEDICARE

## 2025-04-24 DIAGNOSIS — M54.9 DORSALGIA: Primary | ICD-10-CM

## 2025-04-24 DIAGNOSIS — M54.2 CERVICALGIA: ICD-10-CM

## 2025-04-24 PROCEDURE — 97110 THERAPEUTIC EXERCISES: CPT

## 2025-04-24 PROCEDURE — 97164 PT RE-EVAL EST PLAN CARE: CPT

## 2025-04-24 NOTE — LETTER
2025    Yash Bull DO  [address] [address]    Patient: Estrella Raza   YOB: 1955   Date of Visit: 2025     Encounter Diagnosis     ICD-10-CM    1. Dorsalgia  M54.9       2. Cervicalgia  M54.2           Dear Dr. Yash Bull, DO:    Thank you for your recent referral of Estrella Raza. Please review the attached evaluation summary from Estrella SANDERS's recent visit.     Please verify that you agree with the plan of care by signing the attached order.     If you have any questions or concerns, please do not hesitate to call.     I sincerely appreciate the opportunity to share in the care of one of your patients and hope to have another opportunity to work with you in the near future.       Sincerely,    Niranjan Murcia, PT      Referring Provider:      I certify that I have read the below Plan of Care and certify the need for these services furnished under this plan of treatment while under my care.                    Yash Bull DO  [address] [address]  Via Fax: [phone]          PT Evaluation     Today's date: 2025  Patient name: Estrella Raza  : 1955  MRN: [id number]  Referring provider: Yash Bull DO  Dx:   Encounter Diagnosis     ICD-10-CM    1. Dorsalgia  M54.9       2. Cervicalgia  M54.2           Start Time: 1346  Stop Time: 1430  Total time in clinic (min): 44 minutes    Assessment  Impairments: abnormal or restricted ROM, activity intolerance, lacks appropriate home exercise program, pain with function, poor posture , participation limitations and activity limitations  Symptom irritability: moderate    Assessment details: Re-Eval 25:  Estrella Raza is a 69 y.o. female presenting to OP PT clinic in [place] w/ referral for cervicalgia w/o radicular sx and a new script for dorsalgia. Pt presents w/ decreased spinal mobility, impaired flexibility of  soft tissue in the L/S & BLEs, decreased strength in core and back extensor musculature.   Pt has difficulty performing ADLs and recreational activities due to above mentioned deficits.  Pt would benefit from skilled therapy to address impairments, allowing pt to perform ADLs and recreational activities w/o restriction or pain to improve QoL and return to PLOF.  PT gave pt HEP focusing on performing exercises/ activities outside of the clinic to further improve and address impairments.  Thank you for this referral!      Estrella Raza is a 69 y.o. female presenting to OP PT clinic in [place] w/ referral for cervicalgia w/o radicular sx.  Pt presents w/ decreased strength in CS ROM (retraction endurance), decreased P/AROM in CS mvmts,  decreased ability to perform functional and recreational activities due to neck pain.  Pt has difficulty performing ADLs and recreational activities due to above mentioned deficits.  Pt would benefit from skilled therapy to address impairments, allowing pt to perform ADLs and recreational activities w/o restriction or pain to improve QoL and return to PLOF.  PT gave pt HEP focusing on performing exercises/ activities outside of the clinic to further improve and address impairments.  Thank you for this referral!  Understanding of Dx/Px/POC: good     Prognosis: good    Goals  STG:  -Pt will improve pain from 5/10 to 2/10 at worst to allow reduced difficulty performing ADLs due to pain in 4 weeks.  -Pt will increase CS L SB AROM from 50* to 60* to allow pt w/ improved ability to perform house chores w/ less difficulty in 4 weeks.  -Pt will increase CS retraction endurance strength allow pt w/ improved ability of performing prolonged CS retraction w/ less difficulty in 4 weeks.    LTG:  -Pt will be d/c from OP PT w/ I HEP to allow pt to continue improving upon their impairments for improved ADLs/ recreational activities in 12 weeks.  -Pt will improve neck pain sx allowing pt to  return to full recreational activities such as lifting, w/o difficulty in 12 weeks.       Plan  Patient would benefit from: skilled physical therapy  Planned modality interventions: thermotherapy: hydrocollator packs, cryotherapy and neuromuscular electric stimulation    Planned therapy interventions: abdominal trunk stabilization, manual therapy, massage, neuromuscular re-education, postural training, therapeutic activities, therapeutic exercise, home exercise program, functional ROM exercises, flexibility and balance/weight bearing training    Frequency: 2x week  Duration in weeks: 12  Plan of Care beginning date: 4/1/2025  Plan of Care expiration date: 6/24/2025  Treatment plan discussed with: patient  Plan details: Begin POC focusing on addressing & improving impairments listed in eval.        Subjective Evaluation    History of Present Illness  Mechanism of injury: Re-Eval 4/24/25:  Pt has ankylosing spondylitis, severe osteoporosis in spine, and spinal stenosis.  Pt has had LBP for years, has been more severe in recent weeks.  Pt states that R side is worse than L side.  Pt has difficulty performing forward bend activities such as reaching in bottom cabinets.     Pt states that CS pain was persistent during employment, has been fully retired for roughly a year, had no neck pain 6 months after intermediate.  Had COVID in November and has side effects of Long COVID, sx of neck pain returned after COVID and have been persistent ever since, roughly 4-5 months of time.  No radicular sx.  R sided pain is > than L.  Reducing weights for exercises.  Pt states that she has changes in BUE strength and  strength, but no N/T in hands.  Pt using Bengay topical for pain relief.  Was prescribed muscle relaxers, but not taking anymore.      Patient Goals  Patient goals for therapy: decreased pain, increased motion, return to sport/leisure activities, independence with ADLs/IADLs and increased strength    Pain  Current pain  ratin  At best pain ratin  At worst pain ratin  Quality: dull ache, throbbing and tight  Aggravating factors: overhead activity and lifting      Diagnostic Tests  X-ray: abnormal      Objective     Postural Observations  Seated posture: fair  Standing posture: fair  Correction of posture: makes symptoms better      Palpation   Left   Tenderness of the quadratus lumborum.     Right   Tenderness of the quadratus lumborum.     Tenderness     Left Hip   Tenderness in the PSIS.     Right Hip   Tenderness in the PSIS.     Active Range of Motion   Cervical/Thoracic Spine       Cervical    Flexion: 55 degrees  with pain  Extension: 50 degrees      Left lateral flexion: 50 degrees     with pain  Right lateral flexion: 55 degrees     with pain  Left rotation: 75 degrees  Right rotation: 75 degrees     Left Shoulder   Normal active range of motion    Right Shoulder   Normal active range of motion    Lumbar   Normal active range of motion    Passive Range of Motion   Cervical/Thoracic Spine   Cervical     Left lateral flexion (degrees): 65    Lumbar   Normal passive range of motion    Strength/Myotome Testing     Left Shoulder   Normal muscle strength    Right Shoulder   Normal muscle strength    Left Hip   Planes of Motion   Flexion: 4+  Abduction: 4+  Adduction: 4+    Right Hip   Planes of Motion   Flexion: 4+  Abduction: 4+  Adduction: 4+    Left Knee   Flexion: 4+  Extension: 4+    Right Knee   Flexion: 4+  Extension: 4+    Additional Strength Details  Rusty Prone back extension =  2 mins 30 s  Rusty Core Flexion (45* hold in supine position) = 82s    Tests   Cervical   Positive vertical compression.  Negative cervical distraction.     Lumbar   Positive vertical compression .   Negative SIJ compression and sacroiliac distraction.     Left   Negative passive SLR.     Right   Negative passive SLR.     Left Hip   Positive piriformis.     Right Hip   Positive piriformis.                Precautions: dry  "eyes      Visit Count 6 1 3 4 5       Manual 4-22 4-24 4-10 4-15 4/17   CS Decompression + SO release    EC  10' EC 10'     CS PROM stretch in all planes of motion b/l   R UT stretch to the L.  2' R UT stretch to the L  2'  R UT stretch w/ massage 10'   B/L pec stretch          R UE nerve glides    1x5  1x5 w/gentle sh depression 1x10 w/gentle sh depression  HEP   IASTM L cerv, UT EC CONTINUE IASTM L CERV, UT      B/L hip stretches:  Piri, Quad                Neuro Re-Ed   4-10 4-15                                   Ther Ex   4-10 4-15     DISCONTINUE CS EXERCISES (ADDED TO HEP) FOCUS ON CORE         UBE 8' alt 10' alt 5' katie 6' alt  8' alt   TM        MTP/LTP Blue 3\" x15 ea   Green 3\" x10  Blue 3\"x15 ea   Rizwan ER Red 3\" x15   Red 3\" x15 Red 3\" x15   Pallof Press        Bridge w/ holds        Split leg bridge        Plank        Side Plank        Bird Dog        Dead bug w/ holds                Supine CS retractions 5\" x5  At end of prone ex.   Seated  5\" x10 supine  5\" x10  supine  5\" x10   Scap Squeezes HEP  HEP      UT Stretch HEP        Posterior Scap rolls         90:90 stretch in doorway         Prone I, Y, Ts  Neurofeedback w/ palpation of Lower Trap T,Y,I    3\" x10 ea NV  Y's 3\" x10  I, Y, T  3\"x15 ea     Prone 90/90 3\" x10  3\" x10 3\" x10  3\" x15   Prone DB rows  Neurofeedback w/ palpation of Lower Trap 2# 3\" hold x10 ea NV    2# 3\" hold 10x ea b/l   Seated CS retraction  5\" x10 5\" x10 5\" x10     Plank on Elbows w/ CS                              RE-Eval tests & measures  DF                           Ther Activity                             Gait Training                             Modalities         CS Traction         HP on CS w/ IFC Estim  Pt declined MHP to home Pt declined to home Pt declined to home Pt declined to home          Access Code: [email or web address]  URL: [email or web address]  Date: 04/01/2025  Prepared by: Niranjan Murcia    Exercises  - Seated Scapular Retraction  - 1 x daily - 7 x weekly - " 3 sets - 10 reps  - Seated Upper Trapezius Stretch  - 1 x daily - 7 x weekly - 3 sets - 10 reps  - Seated Cervical Retraction  - 1 x daily - 7 x weekly - 3 sets - 10 reps  - Supine Chin Tuck  - 1 x daily - 7 x weekly - 3 sets - 10 reps

## 2025-04-24 NOTE — PROGRESS NOTES
PT Evaluation     Today's date: 2025  Patient name: Estrella Raza  : 1955  MRN: [id number]  Referring provider: Yash Bull DO  Dx:   Encounter Diagnosis     ICD-10-CM    1. Dorsalgia  M54.9       2. Cervicalgia  M54.2           Start Time: 1346  Stop Time: 1430  Total time in clinic (min): 44 minutes    Assessment  Impairments: abnormal or restricted ROM, activity intolerance, lacks appropriate home exercise program, pain with function, poor posture , participation limitations and activity limitations  Symptom irritability: moderate    Assessment details: Re-Eval 25:  Estrella Raza is a 69 y.o. female presenting to OP PT clinic in [place] w/ referral for cervicalgia w/o radicular sx and a new script for dorsalgia. Pt presents w/ decreased spinal mobility, impaired flexibility of soft tissue in the L/S & BLEs, decreased strength in core and back extensor musculature.   Pt has difficulty performing ADLs and recreational activities due to above mentioned deficits.  Pt would benefit from skilled therapy to address impairments, allowing pt to perform ADLs and recreational activities w/o restriction or pain to improve QoL and return to PLOF.  PT gave pt HEP focusing on performing exercises/ activities outside of the clinic to further improve and address impairments.  Thank you for this referral!      Estrella Raza is a 69 y.o. female presenting to OP PT clinic in [place] w/ referral for cervicalgia w/o radicular sx.  Pt presents w/ decreased strength in CS ROM (retraction endurance), decreased P/AROM in CS mvmts,  decreased ability to perform functional and recreational activities due to neck pain.  Pt has difficulty performing ADLs and recreational activities due to above mentioned deficits.  Pt would benefit from skilled therapy to address impairments, allowing pt to perform ADLs and recreational activities w/o restriction or pain to improve QoL and return to PLOF.  PT  gave pt HEP focusing on performing exercises/ activities outside of the clinic to further improve and address impairments.  Thank you for this referral!  Understanding of Dx/Px/POC: good     Prognosis: good    Goals  STG:  -Pt will improve pain from 5/10 to 2/10 at worst to allow reduced difficulty performing ADLs due to pain in 4 weeks.  -Pt will increase CS L SB AROM from 50* to 60* to allow pt w/ improved ability to perform house chores w/ less difficulty in 4 weeks.  -Pt will increase CS retraction endurance strength allow pt w/ improved ability of performing prolonged CS retraction w/ less difficulty in 4 weeks.    LTG:  -Pt will be d/c from OP PT w/ I HEP to allow pt to continue improving upon their impairments for improved ADLs/ recreational activities in 12 weeks.  -Pt will improve neck pain sx allowing pt to return to full recreational activities such as lifting, w/o difficulty in 12 weeks.       Plan  Patient would benefit from: skilled physical therapy  Planned modality interventions: thermotherapy: hydrocollator packs, cryotherapy and neuromuscular electric stimulation    Planned therapy interventions: abdominal trunk stabilization, manual therapy, massage, neuromuscular re-education, postural training, therapeutic activities, therapeutic exercise, home exercise program, functional ROM exercises, flexibility and balance/weight bearing training    Frequency: 2x week  Duration in weeks: 12  Plan of Care beginning date: 4/1/2025  Plan of Care expiration date: 6/24/2025  Treatment plan discussed with: patient  Plan details: Begin POC focusing on addressing & improving impairments listed in eval.        Subjective Evaluation    History of Present Illness  Mechanism of injury: Re-Eval 4/24/25:  Pt has ankylosing spondylitis, severe osteoporosis in spine, and spinal stenosis.  Pt has had LBP for years, has been more severe in recent weeks.  Pt states that R side is worse than L side.  Pt has difficulty  performing forward bend activities such as reaching in bottom cabinets.     Pt states that CS pain was persistent during employment, has been fully retired for roughly a year, had no neck pain 6 months after half-way.  Had COVID in November and has side effects of Long COVID, sx of neck pain returned after COVID and have been persistent ever since, roughly 4-5 months of time.  No radicular sx.  R sided pain is > than L.  Reducing weights for exercises.  Pt states that she has changes in BUE strength and  strength, but no N/T in hands.  Pt using Bengay topical for pain relief.  Was prescribed muscle relaxers, but not taking anymore.      Patient Goals  Patient goals for therapy: decreased pain, increased motion, return to sport/leisure activities, independence with ADLs/IADLs and increased strength    Pain  Current pain ratin  At best pain ratin  At worst pain ratin  Quality: dull ache, throbbing and tight  Aggravating factors: overhead activity and lifting      Diagnostic Tests  X-ray: abnormal      Objective     Postural Observations  Seated posture: fair  Standing posture: fair  Correction of posture: makes symptoms better      Palpation   Left   Tenderness of the quadratus lumborum.     Right   Tenderness of the quadratus lumborum.     Tenderness     Left Hip   Tenderness in the PSIS.     Right Hip   Tenderness in the PSIS.     Active Range of Motion   Cervical/Thoracic Spine       Cervical    Flexion: 55 degrees  with pain  Extension: 50 degrees      Left lateral flexion: 50 degrees     with pain  Right lateral flexion: 55 degrees     with pain  Left rotation: 75 degrees  Right rotation: 75 degrees     Left Shoulder   Normal active range of motion    Right Shoulder   Normal active range of motion    Lumbar   Normal active range of motion    Passive Range of Motion   Cervical/Thoracic Spine   Cervical     Left lateral flexion (degrees): 65    Lumbar   Normal passive range of  "motion    Strength/Myotome Testing     Left Shoulder   Normal muscle strength    Right Shoulder   Normal muscle strength    Left Hip   Planes of Motion   Flexion: 4+  Abduction: 4+  Adduction: 4+    Right Hip   Planes of Motion   Flexion: 4+  Abduction: 4+  Adduction: 4+    Left Knee   Flexion: 4+  Extension: 4+    Right Knee   Flexion: 4+  Extension: 4+    Additional Strength Details  Rusty Prone back extension =  2 mins 30 s  Rusty Core Flexion (45* hold in supine position) = 82s    Tests   Cervical   Positive vertical compression.  Negative cervical distraction.     Lumbar   Positive vertical compression .   Negative SIJ compression and sacroiliac distraction.     Left   Negative passive SLR.     Right   Negative passive SLR.     Left Hip   Positive piriformis.     Right Hip   Positive piriformis.                Precautions: dry eyes      Visit Count 6 1 3 4 5       Manual 4-22 4-24 4-10 4-15 4/17   CS Decompression + SO release    EC  10' EC 10'     CS PROM stretch in all planes of motion b/l   R UT stretch to the L.  2' R UT stretch to the L  2'  R UT stretch w/ massage 10'   B/L pec stretch          R UE nerve glides    1x5  1x5 w/gentle sh depression 1x10 w/gentle sh depression  HEP   IASTM L cerv, UT EC CONTINUE IASTM L CERV, UT      B/L hip stretches:  Piri, Quad                Neuro Re-Ed   4-10 4-15                                   Ther Ex   4-10 4-15     DISCONTINUE CS EXERCISES (ADDED TO HEP) FOCUS ON CORE         UBE 8' alt 10' alt 5' katie 6' alt  8' alt   TM        MTP/LTP Blue 3\" x15 ea   Green 3\" x10  Blue 3\"x15 ea   Rizwan ER Red 3\" x15   Red 3\" x15 Red 3\" x15   Pallof Press        Bridge w/ holds        Split leg bridge        Plank        Side Plank        Bird Dog        Dead bug w/ holds                Supine CS retractions 5\" x5  At end of prone ex.   Seated  5\" x10 supine  5\" x10  supine  5\" x10   Scap Squeezes HEP  HEP      UT Stretch HEP        Posterior Scap rolls         90:90 stretch in " "doorway         Prone I, Y, Ts  Neurofeedback w/ palpation of Lower Trap T,Y,I    3\" x10 ea NV  Y's 3\" x10  I, Y, T  3\"x15 ea     Prone 90/90 3\" x10  3\" x10 3\" x10  3\" x15   Prone DB rows  Neurofeedback w/ palpation of Lower Trap 2# 3\" hold x10 ea NV    2# 3\" hold 10x ea b/l   Seated CS retraction  5\" x10 5\" x10 5\" x10     Plank on Elbows w/ CS                              RE-Eval tests & measures  DF                           Ther Activity                             Gait Training                             Modalities         CS Traction         HP on CS w/ IFC Estim  Pt declined MHP to home Pt declined to home Pt declined to home Pt declined to home          Access Code: [email or web address]  URL: [email or web address]  Date: 04/01/2025  Prepared by: Niranjan Murcia    Exercises  - Seated Scapular Retraction  - 1 x daily - 7 x weekly - 3 sets - 10 reps  - Seated Upper Trapezius Stretch  - 1 x daily - 7 x weekly - 3 sets - 10 reps  - Seated Cervical Retraction  - 1 x daily - 7 x weekly - 3 sets - 10 reps  - Supine Chin Tuck  - 1 x daily - 7 x weekly - 3 sets - 10 reps    "

## 2025-04-29 ENCOUNTER — OFFICE VISIT (OUTPATIENT)
Dept: PHYSICAL THERAPY | Facility: HOME HEALTHCARE | Age: 70
End: 2025-04-29
Payer: MEDICARE

## 2025-04-29 DIAGNOSIS — M54.2 CERVICALGIA: Primary | ICD-10-CM

## 2025-04-29 PROCEDURE — 97140 MANUAL THERAPY 1/> REGIONS: CPT

## 2025-04-29 PROCEDURE — 97112 NEUROMUSCULAR REEDUCATION: CPT

## 2025-04-29 PROCEDURE — 97110 THERAPEUTIC EXERCISES: CPT

## 2025-04-29 NOTE — PROGRESS NOTES
"Daily Note     Today's date: 2025  Patient name: Estrella Raza  : 1955  MRN: [id number]  Referring provider: Yash Bull DO  Dx: No diagnosis found.    Start Time: 1257          Subjective: My neck is doing better and I was able to do the new ex for my LB.     Objective: See treatment diary below    Assessment: Pt with good ariela to added TE and MT for LB and core strength. VC's needed for correct form and postural control. Pt denied increased cerv or LB pain with all activities. Pt with B LE HS and piriformis tightness noted. Pt with STR noted B cerv and UT.  Patient would benefit from continued PT    Plan: Continue per plan of care.      Precautions: dry eyes      Visit Count 6 7 8 4 5       Manual 4-22 4-24 4-29 4-15 4/17   CS Decompression + SO release     EC 10'     CS PROM stretch in all planes of motion b/l    R UT stretch to the L  2'  R UT stretch w/ massage 10'   B/L pec stretch          R UE nerve glides    1x5  1x5 w/gentle sh depression 1x10 w/gentle sh depression  HEP   IASTM L cerv, UT EC CONTINUE IASTM L CERV, UT Rizwan cerv, UT  EC     B/L hip stretches:  Piri, Quad   EC             Neuro Re-Ed   4-29 4-15                                   Ther Ex   4-29 4-15     DISCONTINUE CS EXERCISES (ADDED TO HEP) FOCUS ON CORE         UBE 8' alt 10' alt DC 6' alt  8' alt   TM   NV     NuStep   L2  10'     MTP/LTP Blue 3\" x15 ea  Blue 3\" x15 ea Green 3\" x10  Blue 3\"x15 ea   Rizwan ER Red 3\" x15   Red 3\" x15 Red 3\" x15   Pallof Press   Blue 3\" x10 R/L     Bridge w/ holds        Split leg bridge        Plank        Side Plank        Bird Dog        Dead bug w/ holds        P-ball flex + diag   10\" x5 ea      Supine CS retractions 5\" x5  At end of prone ex.   Seated  5\" x10 supine  5\" x10  supine  5\" x10   Scap Squeezes HEP  HEP      UT Stretch HEP        Posterior Scap rolls         90:90 stretch in doorway         Prone I, Y, Ts  Neurofeedback w/ palpation of Lower Trap T,Y,I    3\" x10 ea NV  " "Y's 3\" x10  I, Y, T  3\"x15 ea     Prone 90/90 3\" x10   3\" x10  3\" x15   Prone DB rows  Neurofeedback w/ palpation of Lower Trap 2# 3\" hold x10 ea NV    2# 3\" hold 10x ea b/l   Seated CS retraction  5\" x10  5\" x10     Plank on Elbows w/ CS          Seated hip add  Seated hip abd   Ball 5\" x10  Red 5\" x10       LF    NV       RE-Eval tests & measures  DF                           Ther Activity                             Gait Training                             Modalities         CS Traction         HP on CS w/ IFC Estim  Pt declined MHP to home Pt declined to home Pt declined to home Pt declined to home              "

## 2025-05-01 ENCOUNTER — APPOINTMENT (OUTPATIENT)
Dept: PHYSICAL THERAPY | Facility: HOME HEALTHCARE | Age: 70
End: 2025-05-01
Payer: MEDICARE

## 2025-05-02 ENCOUNTER — OFFICE VISIT (OUTPATIENT)
Dept: PHYSICAL THERAPY | Facility: HOME HEALTHCARE | Age: 70
End: 2025-05-02
Attending: NURSE PRACTITIONER
Payer: MEDICARE

## 2025-05-02 DIAGNOSIS — M54.2 CERVICALGIA: Primary | ICD-10-CM

## 2025-05-02 PROCEDURE — 97112 NEUROMUSCULAR REEDUCATION: CPT

## 2025-05-02 PROCEDURE — 97110 THERAPEUTIC EXERCISES: CPT

## 2025-05-02 NOTE — PROGRESS NOTES
"Daily Note     Today's date: 2025  Patient name: Estrella Raza  : 1955  MRN: [id number]  Referring provider: Yash Bull DO  Dx: No diagnosis found.    Start Time: 912          Subjective: I feel good today.     Objective: See treatment diary below    Assessment: Pt ariela session well with added TE as per flowsheet to progress with LB and core strength. Pt ariela TE well and without c/o increased cerv or LBP.  Pt with B LE HS and Piri end range tightness noted with MT.  Patient would benefit from continued PT    Plan: Continue per plan of care.      Precautions: dry eyes      Visit Count 6 1 2 3 5       Manual  5-2    CS Decompression + SO release          CS PROM stretch in all planes of motion b/l      R UT stretch w/ massage 10'   B/L pec stretch          R UE nerve glides     1x10 w/gentle sh depression  HEP   IASTM L cerv, UT EC CONTINUE IASTM L CERV, UT Rizwan cerv, UT  EC NP  NV if able    B/L hip stretches:  Piri, Quad   EC Rizwan HS, Piri  EC            Neuro Re-Ed    5-2                                   Ther Ex    5-2     DISCONTINUE CS EXERCISES (ADDED TO HEP) FOCUS ON CORE         UBE 8' alt 10' alt DC   8' alt   TM   NV 2.5 mph  8'     NuStep   L2  10'     MTP/LTP Blue 3\" x15 ea  Blue 3\" x15 ea Blue 3\" x15  Blue 3\"x15 ea   Rizwan ER Red 3\" x15    Red 3\" x15   Pallof Press   Blue 3\" x10 R/L Blue 3\" x10 R/L    Bridge w/ holds        Split leg bridge        Plank        Side Plank        Bird Dog        Dead bug w/ holds        P-ball flex + diag   10\" x5 ea  5\" x10 ea     Supine CS retractions 5\" x5  At end of prone ex.   Seated  5\" x10 Seated  5\" x10  supine  5\" x10   Scap Squeezes HEP  HEP      UT Stretch HEP        Posterior Scap rolls         90:90 stretch in doorway         Prone I, Y, Ts  Neurofeedback w/ palpation of Lower Trap T,Y,I    3\" x10 ea NV  T,I,Y's   3\" x10  ea  I, Y, T  3\"x15 ea     Prone  3\" x10   NV  3\" x15   Prone DB rows  Neurofeedback w/ " "palpation of Lower Trap 2# 3\" hold x10 ea NV  2# x15  2# 3\" hold 10x ea b/l   Plank on Elbows w/ CS          Seated hip add  Seated hip abd   Ball 5\" x10  Red 5\" x10 DC      LF    NV Abd 20# x20  Add 15# x20      RE-Eval tests & measures  DF                           Ther Activity                             Gait Training                             Modalities         CS Traction         HP on CS w/ IFC Estim  Pt declined MHP to home Pt declined to home Pt declined to home Pt declined to home                "

## 2025-05-06 ENCOUNTER — OFFICE VISIT (OUTPATIENT)
Dept: PHYSICAL THERAPY | Facility: HOME HEALTHCARE | Age: 70
End: 2025-05-06
Attending: NURSE PRACTITIONER
Payer: MEDICARE

## 2025-05-06 DIAGNOSIS — M54.2 CERVICALGIA: Primary | ICD-10-CM

## 2025-05-06 PROCEDURE — 97110 THERAPEUTIC EXERCISES: CPT

## 2025-05-06 PROCEDURE — 97140 MANUAL THERAPY 1/> REGIONS: CPT

## 2025-05-06 NOTE — PROGRESS NOTES
"Daily Note     Today's date: 2025  Patient name: Estrella Raza  : 1955  MRN: [id number]  Referring provider: Yash Bull DO  Dx: No diagnosis found.    Start Time: 1300          Subjective: Everything is bothering me today even the joints in my fingers. My back is worse than my neck.     Objective: See treatment diary below    Assessment:  Pt ariela session well with alternation of TE between cerv and LB.  Pt reports LB and cerv jt discomfort and was able to complete TE without c/o increased pain level.  Pt responds well to B LE MT and IASTM to R cerv/UT. Pt reports consistency with HEP.  . Patient would benefit from continued PT    Plan: Continue per plan of care.      Precautions: dry eyes      Visit Count 6 7 1 2 3       Manual  5-2 5-6   CS Decompression + SO release          CS PROM stretch in all planes of motion b/l        B/L pec stretch          R UE nerve glides        IASTM L cerv, UT EC CONTINUE IASTM L CERV, UT Rizwan cerv, UT  EC NP  NV if able IASTM R cerv   B/L hip stretches:  Piri, Quad   EC Rizwan HS, Piri  EC Rizwan HS,Piri  EC           Neuro Re-Ed    5-2  5-6                                 Ther Ex  5-6 -29 5-2  5-6   DISCONTINUE CS EXERCISES (ADDED TO HEP) FOCUS ON CORE         UBE 8' alt 10' alt DC      TM  2.5# NV 2.5 mph  8'  2.5 mph  8'   NuStep   L2  10'     MTP/LTP Blue 3\" x15 ea Blue 3\" x15 ea Blue 3\" x15 ea Blue 3\" x15  Blue 3\"x15 ea   Rizwan ER Red 3\" x15 Red 3\" x15      Pallof Press   Blue 3\" x10 R/L Blue 3\" x10 R/L Blue 3\" x10 R/L   Bridge w/ holds        Split leg bridge        Plank        Side Plank        Bird Dog        Dead bug w/ holds        P-ball flex + diag   10\" x5 ea  5\" x10 ea  5\" x10 ea   Supine CS retractions 5\" x5  At end of prone ex.   Seated  5\" x10 Seated  5\" x10  supine  5\" x10   Scap Squeezes HEP  HEP      UT Stretch HEP        Posterior Scap rolls         90:90 stretch in doorway         Prone I, Y, Ts  Neurofeedback w/ palpation " "of Lower Trap T,Y,I    3\" x10 ea NV  T,I,Y's   3\" x10  ea  I, Y, T  3\"x10   ea     Prone 90/90 3\" x10   NV     Prone DB rows  Neurofeedback w/ palpation of Lower Trap 2# 3\" hold x10 ea NV  2# x15  5# 3\" hold 1x 15 ea b/l   Plank on Elbows w/ CS          Seated hip add  Seated hip abd   Ball 5\" x10  Red 5\" x10 DC      LF    NV Abd 20# x20  Add 15# x20  Abd 30# x15  Add 15# x20    RE-Eval tests & measures                             Ther Activity                             Gait Training                             Modalities         CS Traction         HP on CS w/ IFC Estim  Pt declined MHP to home Pt declined to home Pt declined to home Pt declined to home                  "

## 2025-05-08 ENCOUNTER — APPOINTMENT (OUTPATIENT)
Dept: PHYSICAL THERAPY | Facility: HOME HEALTHCARE | Age: 70
End: 2025-05-08
Attending: NURSE PRACTITIONER
Payer: MEDICARE

## 2025-05-09 ENCOUNTER — APPOINTMENT (OUTPATIENT)
Dept: PHYSICAL THERAPY | Facility: HOME HEALTHCARE | Age: 70
End: 2025-05-09
Attending: NURSE PRACTITIONER
Payer: MEDICARE

## 2025-05-13 ENCOUNTER — APPOINTMENT (OUTPATIENT)
Dept: PHYSICAL THERAPY | Facility: HOME HEALTHCARE | Age: 70
End: 2025-05-13
Attending: NURSE PRACTITIONER
Payer: MEDICARE

## 2025-05-15 ENCOUNTER — APPOINTMENT (OUTPATIENT)
Dept: PHYSICAL THERAPY | Facility: HOME HEALTHCARE | Age: 70
End: 2025-05-15
Attending: NURSE PRACTITIONER
Payer: MEDICARE

## 2025-05-20 ENCOUNTER — APPOINTMENT (OUTPATIENT)
Dept: PHYSICAL THERAPY | Facility: HOME HEALTHCARE | Age: 70
End: 2025-05-20
Attending: NURSE PRACTITIONER
Payer: MEDICARE

## 2025-05-22 ENCOUNTER — APPOINTMENT (OUTPATIENT)
Dept: PHYSICAL THERAPY | Facility: HOME HEALTHCARE | Age: 70
End: 2025-05-22
Attending: NURSE PRACTITIONER
Payer: MEDICARE

## 2025-05-27 ENCOUNTER — APPOINTMENT (OUTPATIENT)
Dept: PHYSICAL THERAPY | Facility: HOME HEALTHCARE | Age: 70
End: 2025-05-27
Attending: NURSE PRACTITIONER
Payer: MEDICARE

## 2025-05-29 ENCOUNTER — APPOINTMENT (OUTPATIENT)
Dept: PHYSICAL THERAPY | Facility: HOME HEALTHCARE | Age: 70
End: 2025-05-29
Attending: NURSE PRACTITIONER
Payer: MEDICARE